# Patient Record
Sex: MALE | Race: WHITE | NOT HISPANIC OR LATINO | Employment: OTHER | URBAN - NONMETROPOLITAN AREA
[De-identification: names, ages, dates, MRNs, and addresses within clinical notes are randomized per-mention and may not be internally consistent; named-entity substitution may affect disease eponyms.]

---

## 2024-05-11 ENCOUNTER — OFFICE VISIT (OUTPATIENT)
Dept: URGENT CARE | Facility: CLINIC | Age: 59
End: 2024-05-11
Payer: COMMERCIAL

## 2024-05-11 ENCOUNTER — HOSPITAL ENCOUNTER (OUTPATIENT)
Facility: MEDICAL CENTER | Age: 59
End: 2024-05-11
Attending: NURSE PRACTITIONER
Payer: COMMERCIAL

## 2024-05-11 VITALS
DIASTOLIC BLOOD PRESSURE: 70 MMHG | RESPIRATION RATE: 14 BRPM | HEART RATE: 100 BPM | OXYGEN SATURATION: 96 % | TEMPERATURE: 98 F | HEIGHT: 68 IN | WEIGHT: 178.8 LBS | SYSTOLIC BLOOD PRESSURE: 110 MMHG | BODY MASS INDEX: 27.1 KG/M2

## 2024-05-11 DIAGNOSIS — R30.0 DYSURIA: ICD-10-CM

## 2024-05-11 DIAGNOSIS — N30.00 ACUTE CYSTITIS WITHOUT HEMATURIA: ICD-10-CM

## 2024-05-11 LAB
APPEARANCE UR: CLEAR
BILIRUB UR STRIP-MCNC: NEGATIVE MG/DL
COLOR UR AUTO: NORMAL
GLUCOSE UR STRIP.AUTO-MCNC: NORMAL MG/DL
KETONES UR STRIP.AUTO-MCNC: NEGATIVE MG/DL
LEUKOCYTE ESTERASE UR QL STRIP.AUTO: NEGATIVE
NITRITE UR QL STRIP.AUTO: POSITIVE
PH UR STRIP.AUTO: 7 [PH] (ref 5–8)
PROT UR QL STRIP: NEGATIVE MG/DL
RBC UR QL AUTO: NEGATIVE
SP GR UR STRIP.AUTO: 1.01
UROBILINOGEN UR STRIP-MCNC: 1 MG/DL

## 2024-05-11 PROCEDURE — 3074F SYST BP LT 130 MM HG: CPT | Performed by: NURSE PRACTITIONER

## 2024-05-11 PROCEDURE — 99213 OFFICE O/P EST LOW 20 MIN: CPT | Performed by: NURSE PRACTITIONER

## 2024-05-11 PROCEDURE — 3078F DIAST BP <80 MM HG: CPT | Performed by: NURSE PRACTITIONER

## 2024-05-11 PROCEDURE — 81002 URINALYSIS NONAUTO W/O SCOPE: CPT | Performed by: NURSE PRACTITIONER

## 2024-05-11 RX ORDER — SULFAMETHOXAZOLE AND TRIMETHOPRIM 800; 160 MG/1; MG/1
1 TABLET ORAL 2 TIMES DAILY
Qty: 14 TABLET | Refills: 0 | Status: SHIPPED | OUTPATIENT
Start: 2024-05-11 | End: 2024-05-18

## 2024-05-11 RX ORDER — TAMSULOSIN HYDROCHLORIDE 0.4 MG/1
CAPSULE ORAL
COMMUNITY
Start: 2024-03-21 | End: 2024-10-04

## 2024-05-11 RX ORDER — LEVOFLOXACIN 750 MG/1
TABLET, FILM COATED ORAL
COMMUNITY
Start: 2024-05-03 | End: 2024-05-18

## 2024-05-11 NOTE — PROGRESS NOTES
Date: 05/11/24        Chief Complaint   Patient presents with    UTI     Urgency, burning, frequency x yesterday         History of Present Illness: 59 y.o.  male presents to clinic with burning with urination urinary frequency and urgency since yesterday.  Patient denies any fevers body aches pelvic pain or flank pain.  He denies any testicular pain.  He denies any possibility STI STD.  No penile drainage or lesions.      ROS:    No severe shortness of breath   No Cardiac like chest pain, as discussed   As otherwise stated in HPI    Medical/SX/ Social History:  Reviewed per chart    Pertinent Medications:    Current Outpatient Medications on File Prior to Visit   Medication Sig Dispense Refill    tamsulosin (FLOMAX) 0.4 MG capsule TAKE TWO CAPSULES BY MOUTH WITH THE EVENING MEAL FOR PROSTATE      levoFLOXacin (LEVAQUIN) 750 MG tablet TAKE 1 TABLET BY MOUTH ONCE A DAY FOR 10 DAYS       No current facility-administered medications on file prior to visit.        Allergies:    Penicillins     Problem list, medications, and allergies reviewed by myself today in Epic     Physical Exam:    Vitals:    05/11/24 1653   BP: 110/70   Pulse: 100   Resp: 14   Temp: 36.7 °C (98 °F)   SpO2: 96%             Physical Exam  Constitutional:       General: He is not in acute distress.     Appearance: Normal appearance. He is not ill-appearing, toxic-appearing or diaphoretic.   HENT:      Head: Normocephalic and atraumatic.   Cardiovascular:      Rate and Rhythm: Normal rate and regular rhythm.      Heart sounds: Normal heart sounds.   Pulmonary:      Effort: Pulmonary effort is normal.   Abdominal:      General: Abdomen is flat. Bowel sounds are normal.      Palpations: Abdomen is soft.      Tenderness: There is no abdominal tenderness. There is no right CVA tenderness, left CVA tenderness or guarding.   Skin:     General: Skin is warm.      Capillary Refill: Capillary refill takes less than 2 seconds.      Coloration: Skin is not  cyanotic or pale.   Neurological:      Mental Status: He is alert and oriented to person, place, and time.      Gait: Gait is intact.   Psychiatric:         Behavior: Behavior normal. Behavior is cooperative.              Diagnostics:      Results for orders placed or performed in visit on 05/11/24   POCT Urinalysis   Result Value Ref Range    POC Color ORANGE Negative    POC Appearance CLEAR Negative    POC Glucose 100 MG/DL Negative mg/dL    POC Bilirubin NEGATIVE Negative mg/dL    POC Ketones NEGATIVE Negative mg/dL    POC Specific Gravity 1.015 <1.005 - >1.030    POC Blood NEGATIVE Negative    POC Urine PH 7.0 5.0 - 8.0    POC Protein NEGATIVE Negative mg/dL    POC Urobiligen 1.0 Negative (0.2) mg/dL    POC Nitrites POSITIVE Negative    POC Leukocyte Esterase NEGATIVE Negative       Urine Culture Pending     Medical Decision making and plan :  I personally reviewed prior external notes and test results pertinent to today's visit. Pt is clinically stable at today's acute urgent care visit.  Patient appears nontoxic with no acute distress noted. Appropriate for outpatient care at this time.      Pleasant 59 y.o. male presented clinic with HPI exam findings consistent with acute cystitis.  Fortunately no CVA tenderness on exam.  Patient is afebrile and nontoxic.  Will treat based on symptoms.  Urine dip positive discussed to increase fluids, void often, post coital voiding and hygiene.   Urine culture currently pending.   Discussed if indicated will discuss results or MyChart.     1. Acute cystitis without hematuria    - sulfamethoxazole-trimethoprim (BACTRIM DS) 800-160 MG tablet; Take 1 Tablet by mouth 2 times a day for 7 days.  Dispense: 14 Tablet; Refill: 0    2. Dysuria    - POCT Urinalysis  - URINE CULTURE(NEW); Future       Shared decision-making was utilized with patient for treatment plan. Medication discussed included indication for use and the potential benefits and side effects. Education was provided  regarding the aforementioned assessments.  Differential Diagnosis, natural history, and supportive care discussed. All of the patient's questions were answered to their satisfaction at the time of discharge. Patient was encouraged to monitor symptoms closely. Those signs and symptoms which would warrant concern and mandate seeking a higher level of service through the emergency department discussed at length.  Patient stated agreement and understanding of this plan of care.    Disposition:  Home in stable condition       Voice Recognition Disclaimer:  Portions of this document were created using voice recognition software. The software does have a chance of producing errors of grammar and possibly content. I have made every reasonable attempt to correct obvious errors, but there may be errors of grammar and possibly content that I did not discover before finalizing the documentation.    ELIO Pineda.

## 2024-05-12 DIAGNOSIS — R30.0 DYSURIA: ICD-10-CM

## 2024-05-14 LAB
BACTERIA UR CULT: NORMAL
SIGNIFICANT IND 70042: NORMAL
SITE SITE: NORMAL
SOURCE SOURCE: NORMAL

## 2024-05-18 ENCOUNTER — OFFICE VISIT (OUTPATIENT)
Dept: URGENT CARE | Facility: CLINIC | Age: 59
End: 2024-05-18
Payer: COMMERCIAL

## 2024-05-18 ENCOUNTER — HOSPITAL ENCOUNTER (OUTPATIENT)
Facility: MEDICAL CENTER | Age: 59
End: 2024-05-18
Attending: NURSE PRACTITIONER
Payer: COMMERCIAL

## 2024-05-18 VITALS
HEART RATE: 96 BPM | OXYGEN SATURATION: 94 % | SYSTOLIC BLOOD PRESSURE: 114 MMHG | DIASTOLIC BLOOD PRESSURE: 76 MMHG | HEIGHT: 68 IN | WEIGHT: 179 LBS | BODY MASS INDEX: 27.13 KG/M2 | RESPIRATION RATE: 20 BRPM | TEMPERATURE: 97.6 F

## 2024-05-18 DIAGNOSIS — N30.01 ACUTE CYSTITIS WITH HEMATURIA: Primary | ICD-10-CM

## 2024-05-18 DIAGNOSIS — Z87.438 HISTORY OF ACUTE PROSTATITIS: ICD-10-CM

## 2024-05-18 DIAGNOSIS — Z86.19 HISTORY OF SEPSIS: ICD-10-CM

## 2024-05-18 DIAGNOSIS — R30.0 DYSURIA: ICD-10-CM

## 2024-05-18 LAB
APPEARANCE UR: CLEAR
BILIRUB UR STRIP-MCNC: NEGATIVE MG/DL
COLOR UR AUTO: NORMAL
GLUCOSE UR STRIP.AUTO-MCNC: NEGATIVE MG/DL
KETONES UR STRIP.AUTO-MCNC: NORMAL MG/DL
LEUKOCYTE ESTERASE UR QL STRIP.AUTO: NEGATIVE
NITRITE UR QL STRIP.AUTO: POSITIVE
PH UR STRIP.AUTO: 7 [PH] (ref 5–8)
PROT UR QL STRIP: NORMAL MG/DL
RBC UR QL AUTO: NEGATIVE
SP GR UR STRIP.AUTO: 1.02
UROBILINOGEN UR STRIP-MCNC: 0.2 MG/DL

## 2024-05-18 PROCEDURE — 99214 OFFICE O/P EST MOD 30 MIN: CPT | Performed by: NURSE PRACTITIONER

## 2024-05-18 PROCEDURE — 3078F DIAST BP <80 MM HG: CPT | Performed by: NURSE PRACTITIONER

## 2024-05-18 PROCEDURE — 81002 URINALYSIS NONAUTO W/O SCOPE: CPT | Performed by: NURSE PRACTITIONER

## 2024-05-18 PROCEDURE — 3074F SYST BP LT 130 MM HG: CPT | Performed by: NURSE PRACTITIONER

## 2024-05-18 RX ORDER — CIPROFLOXACIN 500 MG/1
500 TABLET, FILM COATED ORAL EVERY 12 HOURS
Qty: 28 TABLET | Refills: 0 | Status: SHIPPED | OUTPATIENT
Start: 2024-05-18 | End: 2024-06-01

## 2024-05-18 RX ORDER — PHENAZOPYRIDINE HYDROCHLORIDE 200 MG/1
200 TABLET, FILM COATED ORAL 3 TIMES DAILY
Qty: 6 TABLET | Refills: 0 | Status: SHIPPED | OUTPATIENT
Start: 2024-05-18 | End: 2024-05-20

## 2024-05-18 NOTE — PROGRESS NOTES
"Carlos Ibanez is a 59 y.o. male who presents for UTI (Burning, going often)      HPI  This is a new problem. Carlos Ibanez is a 59 y.o. patient who presents to urgent care with c/o: Complaints of urinary discomfort, burning and urinary frequency.  Feels like he has a bladder infection.  He was seen a week ago was started on Bactrim.  He did not finish the Bactrim because he was contacted by the provider and said that he could stop taking it.  He does have a history of prostatitis as well as urosepsis.  As this feels very similar to when he had prostatitis before.  He denies fever, chills, nausea, vomiting, significant nocturia.  He has been drinking a lot of extra fluids to try to flush his bladder.  No other aggravating leaving factors.    ROS See HPI    Allergies:       Allergies   Allergen Reactions    Penicillins Rash     Other Reaction(s): Unknown       PMSFS Hx:  No past medical history on file.  No past surgical history on file.  No family history on file.  Social History     Tobacco Use    Smoking status: Not on file    Smokeless tobacco: Not on file   Substance Use Topics    Alcohol use: Not on file       Problems:   There is no problem list on file for this patient.      Medications:   Current Outpatient Medications on File Prior to Visit   Medication Sig Dispense Refill    tamsulosin (FLOMAX) 0.4 MG capsule TAKE TWO CAPSULES BY MOUTH WITH THE EVENING MEAL FOR PROSTATE (Patient not taking: Reported on 5/18/2024)      sulfamethoxazole-trimethoprim (BACTRIM DS) 800-160 MG tablet Take 1 Tablet by mouth 2 times a day for 7 days. 14 Tablet 0     No current facility-administered medications on file prior to visit.        Objective:     /76   Pulse 96   Temp 36.4 °C (97.6 °F) (Temporal)   Resp 20   Ht 1.727 m (5' 8\")   Wt 81.2 kg (179 lb)   SpO2 94%   BMI 27.22 kg/m²     Physical Exam  Vitals and nursing note reviewed.   Constitutional:       Appearance: Normal appearance. He is well-developed. He is " not ill-appearing.   Cardiovascular:      Rate and Rhythm: Normal rate.   Pulmonary:      Effort: Pulmonary effort is normal.   Abdominal:      Palpations: Abdomen is not rigid.   Skin:     General: Skin is warm and dry.   Neurological:      Mental Status: He is alert and oriented to person, place, and time.   Psychiatric:         Mood and Affect: Mood normal.         Behavior: Behavior normal.       Results for orders placed or performed in visit on 05/18/24   POCT Urinalysis   Result Value Ref Range    POC Color orange Negative    POC Appearance clear Negative    POC Glucose negative Negative mg/dL    POC Bilirubin negative Negative mg/dL    POC Ketones ngative Negative mg/dL    POC Specific Gravity 1.020 <1.005 - >1.030    POC Blood negative Negative    POC Urine PH 7.0 5.0 - 8.0    POC Protein trace Negative mg/dL    POC Urobiligen 0.2 Negative (0.2) mg/dL    POC Nitrites positive Negative    POC Leukocyte Esterase negative Negative         Assessment /Associated Orders:      1. Acute cystitis with hematuria  Urine Culture    ciprofloxacin (CIPRO) 500 MG Tab    Referral to Urology      2. Dysuria  POCT Urinalysis    phenazopyridine (PYRIDIUM) 200 MG Tab      3. History of acute prostatitis  ciprofloxacin (CIPRO) 500 MG Tab    Referral to Urology      4. History of sepsis  Referral to Urology              Medical Decision Making:    Carlos is a very pleasant 59 y.o. male who is clinically stable at today's acute urgent care visit.  No acute distress noted.  VSS. Appropriate for outpatient care at this time.   Acute problem today with uncertain prognosis.   He was seen previously in urgent care on May 11, 2024 where he had a positive nitrate urinalysis.  His urine culture was negative for bacterial infection.  His symptoms have persisted and he does test positive for for nitrates today.  Positive nitrates in his urine today with trace protein.  His previous urine culture was negative for bacterial infection.  He  has no symptoms of sepsis today.  Will treat with the extended ciprofloxacin for 2 weeks due to his history.  He has been advised that if his culture comes back negative for bacteria he should continue taking the ciprofloxacin.  He verbalized understanding.  He has just moved to the area.  I did put in a referral to urology for him.  He has been seeing by urology in the past where he used to live.  Stay well-hydrated  Discussed Dx, management options (risks,benefits, and alternatives to planned treatment), natural progression and supportive care.  Expressed understanding and the treatment plan was agreed upon.   Questions were encouraged and answered   Return to urgent care prn if new or worsening sx or if there is no improvement in condition prn.    Educated in Red flags and indications to immediately call 911 or present to the Emergency Department.       Time I spent evaluating Carlos Ibanez in urgent care today was 30  minutes. This time includes preparing for visit, reviewing any pertinent notes or test results, counseling/education, exam, obtaining HPI, interpretation of lab tests, medication management and documentation as indicated above.Time does not include separately billable procedures noted .       Please note that this dictation was created using voice recognition software. I have worked with consultants from the vendor as well as technical experts from [employer] [facility] to optimize the interface. I have made every reasonable attempt to correct obvious errors, but I expect that there are errors of grammar and possibly content that I did not discover before finalizing the note.  This note was electronically signed by provider

## 2024-05-19 DIAGNOSIS — N30.01 ACUTE CYSTITIS WITH HEMATURIA: ICD-10-CM

## 2024-05-21 LAB
BACTERIA UR CULT: NORMAL
SIGNIFICANT IND 70042: NORMAL
SITE SITE: NORMAL
SOURCE SOURCE: NORMAL

## 2024-06-09 ENCOUNTER — OFFICE VISIT (OUTPATIENT)
Dept: URGENT CARE | Facility: CLINIC | Age: 59
End: 2024-06-09
Payer: COMMERCIAL

## 2024-06-09 VITALS
OXYGEN SATURATION: 95 % | SYSTOLIC BLOOD PRESSURE: 146 MMHG | BODY MASS INDEX: 27.43 KG/M2 | RESPIRATION RATE: 16 BRPM | DIASTOLIC BLOOD PRESSURE: 90 MMHG | HEART RATE: 101 BPM | TEMPERATURE: 99 F | HEIGHT: 68 IN | WEIGHT: 181 LBS

## 2024-06-09 DIAGNOSIS — M54.6 ACUTE RIGHT-SIDED THORACIC BACK PAIN: ICD-10-CM

## 2024-06-09 DIAGNOSIS — R10.30 LOWER ABDOMINAL PAIN: ICD-10-CM

## 2024-06-09 DIAGNOSIS — R45.89 ANXIETY ABOUT HEALTH: ICD-10-CM

## 2024-06-09 LAB
APPEARANCE UR: CLEAR
BILIRUB UR STRIP-MCNC: NEGATIVE MG/DL
COLOR UR AUTO: YELLOW
GLUCOSE UR STRIP.AUTO-MCNC: NEGATIVE MG/DL
KETONES UR STRIP.AUTO-MCNC: NEGATIVE MG/DL
LEUKOCYTE ESTERASE UR QL STRIP.AUTO: NEGATIVE
NITRITE UR QL STRIP.AUTO: NORMAL
PH UR STRIP.AUTO: 7 [PH] (ref 5–8)
PROT UR QL STRIP: NEGATIVE MG/DL
RBC UR QL AUTO: NEGATIVE
SP GR UR STRIP.AUTO: 1.01
UROBILINOGEN UR STRIP-MCNC: 0.2 MG/DL

## 2024-06-09 PROCEDURE — 3080F DIAST BP >= 90 MM HG: CPT | Performed by: NURSE PRACTITIONER

## 2024-06-09 PROCEDURE — 81002 URINALYSIS NONAUTO W/O SCOPE: CPT | Performed by: NURSE PRACTITIONER

## 2024-06-09 PROCEDURE — 3077F SYST BP >= 140 MM HG: CPT | Performed by: NURSE PRACTITIONER

## 2024-06-09 PROCEDURE — 99213 OFFICE O/P EST LOW 20 MIN: CPT | Performed by: NURSE PRACTITIONER

## 2024-06-09 NOTE — PROGRESS NOTES
"Subjective:   Carlos Ibanez is a 59 y.o. male who presents for UTI (Right side flank pain x 1 day)       HPI  Patient is a pleasant 59 y.o. who presents for evaluation of concern of right thoracic pain, with concern of UTI versus prostatitis.  Patient also thinks he may have had some burning with urination, however is concerned that possibly he is overthinking the situation.  He has been pushing fluids for his symptoms.  He reports pain could also related to musculoskeletal in nature as he has been quite active.    ROS  All other systems are negative except as documented above within HPI.    MEDS:   Current Outpatient Medications:     tamsulosin (FLOMAX) 0.4 MG capsule, TAKE TWO CAPSULES BY MOUTH WITH THE EVENING MEAL FOR PROSTATE (Patient not taking: Reported on 5/18/2024), Disp: , Rfl:   ALLERGIES:   Allergies   Allergen Reactions    Penicillins Rash     Other Reaction(s): Unknown       Patient's PMH, SocHx, SurgHx, FamHx, Drug allergies and medications were reviewed.     Objective:   BP (!) 146/90   Pulse (!) 101   Temp 37.2 °C (99 °F) (Temporal)   Resp 16   Ht 1.727 m (5' 8\")   Wt 82.1 kg (181 lb)   SpO2 95%   BMI 27.52 kg/m²     Physical Exam  Vitals and nursing note reviewed.   Constitutional:       General: He is awake.      Appearance: Normal appearance. He is well-developed.   HENT:      Head: Normocephalic and atraumatic.      Right Ear: External ear normal.      Left Ear: External ear normal.      Nose: Nose normal.      Mouth/Throat:      Lips: Pink.      Mouth: Mucous membranes are moist.      Pharynx: Oropharynx is clear.   Eyes:      General: Lids are normal.      Extraocular Movements: Extraocular movements intact.      Conjunctiva/sclera: Conjunctivae normal.   Cardiovascular:      Rate and Rhythm: Normal rate and regular rhythm.      Pulses: Normal pulses.      Heart sounds: Normal heart sounds.   Pulmonary:      Effort: Pulmonary effort is normal.      Breath sounds: Normal breath sounds. "   Abdominal:      General: Bowel sounds are normal.   Musculoskeletal:         General: Normal range of motion.        Arms:       Cervical back: Normal range of motion.      Comments: Pain in area as diagrammed.   Skin:     General: Skin is warm and dry.   Neurological:      Mental Status: He is alert and oriented to person, place, and time.   Psychiatric:         Mood and Affect: Mood normal.         Behavior: Behavior normal. Behavior is cooperative.         Thought Content: Thought content normal.         Judgment: Judgment normal.         Assessment/Plan:   Assessment    1. Acute right-sided thoracic back pain    2. Lower abdominal pain  - POCT Urinalysis    3. Anxiety about health      Vital signs stable at today's acute urgent care visit.  Reviewed POCT testing with patient, which is completely within normal limits.  Patient is happy and relieved to hear this, thinks his symptoms may be related to musculoskeletal in nature.  He has upcoming appointment with urology in 2 weeks.      Advised the patient to follow-up with the primary care provider if symptoms persist.  Red flags discussed and indications to immediately call 911 or present to the ED. All questions were encouraged and answered to the patient's satisfaction and understanding, and they agree to the plan of care.     This is an acute problem with uncertain prognosis, medication management and instructions as well as management options were provided.  I personally reviewed prior external notes and test results pertinent to today and independently reviewed and interpreted all diagnostics, to include POCT testing. Time spent evaluating this patient includes preparing for visit, counseling/education, exam, evaluation, obtaining history, and ordering lab/test/procedures.      Please note that this dictation was created using voice recognition software. I have made a reasonable attempt to correct obvious errors, but I expect that there are errors of  grammar and possibly content that I did not discover before finalizing the note.

## 2024-06-20 ENCOUNTER — HOSPITAL ENCOUNTER (OUTPATIENT)
Facility: MEDICAL CENTER | Age: 59
End: 2024-06-20
Attending: UROLOGY
Payer: COMMERCIAL

## 2024-06-20 ENCOUNTER — OFFICE VISIT (OUTPATIENT)
Dept: UROLOGY | Facility: MEDICAL CENTER | Age: 59
End: 2024-06-20
Payer: COMMERCIAL

## 2024-06-20 VITALS
DIASTOLIC BLOOD PRESSURE: 90 MMHG | OXYGEN SATURATION: 99 % | HEIGHT: 68 IN | HEART RATE: 99 BPM | WEIGHT: 181 LBS | TEMPERATURE: 99.2 F | BODY MASS INDEX: 27.43 KG/M2 | SYSTOLIC BLOOD PRESSURE: 132 MMHG

## 2024-06-20 DIAGNOSIS — N41.0 ACUTE PROSTATITIS: ICD-10-CM

## 2024-06-20 DIAGNOSIS — N40.1 BPH WITH OBSTRUCTION/LOWER URINARY TRACT SYMPTOMS: ICD-10-CM

## 2024-06-20 DIAGNOSIS — N13.8 BPH WITH OBSTRUCTION/LOWER URINARY TRACT SYMPTOMS: ICD-10-CM

## 2024-06-20 DIAGNOSIS — R31.9 HEMATURIA, UNSPECIFIED TYPE: ICD-10-CM

## 2024-06-20 LAB
APPEARANCE UR: CLEAR
APPEARANCE UR: CLEAR
BILIRUB UR QL STRIP.AUTO: NEGATIVE
BILIRUB UR STRIP-MCNC: NORMAL MG/DL
COLOR UR AUTO: YELLOW
COLOR UR: YELLOW
GLUCOSE UR STRIP.AUTO-MCNC: NEGATIVE MG/DL
GLUCOSE UR STRIP.AUTO-MCNC: NORMAL MG/DL
KETONES UR STRIP.AUTO-MCNC: NEGATIVE MG/DL
KETONES UR STRIP.AUTO-MCNC: NORMAL MG/DL
LEUKOCYTE ESTERASE UR QL STRIP.AUTO: NEGATIVE
LEUKOCYTE ESTERASE UR QL STRIP.AUTO: NORMAL
MICRO URNS: NORMAL
NITRITE UR QL STRIP.AUTO: NEGATIVE
NITRITE UR QL STRIP.AUTO: NORMAL
PH UR STRIP.AUTO: 7 [PH] (ref 5–8)
PH UR STRIP.AUTO: 7.5 [PH] (ref 5–8)
PROT UR QL STRIP: NEGATIVE MG/DL
PROT UR QL STRIP: NORMAL MG/DL
RBC UR QL AUTO: NEGATIVE
RBC UR QL AUTO: NORMAL
SP GR UR STRIP.AUTO: 1.01
SP GR UR STRIP.AUTO: 1.01
UROBILINOGEN UR STRIP-MCNC: 0.2 MG/DL
UROBILINOGEN UR STRIP.AUTO-MCNC: 0.2 MG/DL

## 2024-06-20 PROCEDURE — 87086 URINE CULTURE/COLONY COUNT: CPT

## 2024-06-20 PROCEDURE — 81002 URINALYSIS NONAUTO W/O SCOPE: CPT | Performed by: UROLOGY

## 2024-06-20 PROCEDURE — 81003 URINALYSIS AUTO W/O SCOPE: CPT

## 2024-06-20 PROCEDURE — 3080F DIAST BP >= 90 MM HG: CPT | Performed by: UROLOGY

## 2024-06-20 PROCEDURE — 99203 OFFICE O/P NEW LOW 30 MIN: CPT | Performed by: UROLOGY

## 2024-06-20 PROCEDURE — 3075F SYST BP GE 130 - 139MM HG: CPT | Performed by: UROLOGY

## 2024-06-20 NOTE — PROGRESS NOTES
Chief Complaint: prostatitis    HPI: Carlos Ibanez is a 59 y.o. male with a history of urosepsis due to acute prostatitis requiring two hospitalizations in 2013; this occurred again in 2022 after which he was started on tamsulosin (which he continues to take now, 0.8 mg daily).     In May he began to feel dysuria and perineal pain again, and was evaluated in urgent care. After a short course of Bactrim he was asked to stop the antibiotic because urine cultures returned negative. He returned to urgent care with continued symptoms and then took two weeks of cipro. Today he feels improved but still has terminal dysuria. No gross hematuria or cloudy urine.     He previously considered a TURP both for obstructive urinary symptoms and to help prevent more cases of prostatitis.     Past Medical History:  History reviewed. No pertinent past medical history.    Past Surgical History:  History reviewed. No pertinent surgical history.    Family History:  History reviewed. No pertinent family history.    Social History:  Social History     Socioeconomic History    Marital status:      Spouse name: Not on file    Number of children: Not on file    Years of education: Not on file    Highest education level: Not on file   Occupational History    Not on file   Tobacco Use    Smoking status: Never    Smokeless tobacco: Never   Substance and Sexual Activity    Alcohol use: Not on file    Drug use: Not on file    Sexual activity: Not on file   Other Topics Concern    Not on file   Social History Narrative    Not on file     Social Determinants of Health     Financial Resource Strain: Not on file   Food Insecurity: Not on file   Transportation Needs: Not on file   Physical Activity: Not on file   Stress: Not on file   Social Connections: Not on file   Intimate Partner Violence: Not on file   Housing Stability: Not on file       Medications:  Current Outpatient Medications   Medication Sig Dispense Refill    tamsulosin (FLOMAX)  0.4 MG capsule        No current facility-administered medications for this visit.       Allergies:  Allergies   Allergen Reactions    Penicillins Rash     Other Reaction(s): Unknown       Review of Systems:  Constitutional: Negative for fever, chills and malaise/fatigue.   HENT: Negative for congestion.    Eyes: Negative for pain.   Respiratory: Negative for cough and shortness of breath.    Cardiovascular: Negative for leg swelling.   Gastrointestinal: Negative for nausea, vomiting, abdominal pain and diarrhea.   Genitourinary: Positive for dysuria  Skin: Negative for rash.   Neurological: Negative for dizziness, focal weakness and headaches.   Endo/Heme/Allergies: Does not bruise/bleed easily.   Psychiatric/Behavioral: Negative for depression.  The patient is not nervous/anxious.        Physical Exam:  Vitals:    06/20/24 1322   BP: (!) 132/90   Pulse: 99   Temp: 37.3 °C (99.2 °F)   SpO2: 99%       GENERAL: well appearing, well nourished, NAD  RESP: respiratory effort normal  ABDOMEN: soft, nontender, nondistended, no masses or organomegaly  HERNIAS: no hernias found on exam  SKIN/LYMPH: normal coloration and turgor, no suspicious skin lesions noted  NEURO/PSYCH: alert, oriented, normal speech, no focal findings or movement disorder noted  EXTREMITIES: peripheral pulses normal, no pedal edema, no clubbing or cyanosis  GENITOURINARY: normal male external genitalia, penis is normal, testes descended bilaterally, and mild right testis tenderness  RECTAL: Normal rectal tone,, no anorectal masses, prostate about 35g and mildly tender with indurated texture      Data Review:    Labs:  POCT UA   Lab Results   Component Value Date/Time    POCCOLOR yellow 06/20/2024 01:21 AM    POCAPPEAR clear 06/20/2024 01:21 AM    POCLEUKEST neg 06/20/2024 01:21 AM    POCNITRITE neg 06/20/2024 01:21 AM    POCUROBILIGE 0.2 06/20/2024 01:21 AM    POCPROTEIN neg 06/20/2024 01:21 AM    POCURPH 7.0 06/20/2024 01:21 AM    POCBLOOD neg  "06/20/2024 01:21 AM    POCSPGRV 1.015 06/20/2024 01:21 AM    POCKETONES neg 06/20/2024 01:21 AM    POCBILIRUBIN neg 06/20/2024 01:21 AM    POCGLUCUA neg 06/20/2024 01:21 AM      CBC No results found for: \"WBC\", \"RBC\", \"HEMOGLOBIN\", \"HEMATOCRIT\", \"MCV\", \"MCH\", \"MCHC\", \"RDW\", \"MPV\", \"LYMPHOCYTES\", \"LYMPHS\", \"MONOCYTES\", \"MONOS\", \"EOSINOPHILS\", \"EOS\", \"BASOPHILS\", \"BASO\", \"NRBC\"    CMP No results found for: \"SODIUM\", \"POTASSIUM\", \"CHLORIDE\", \"CO2\", \"ANION\", \"GLUCOSE\", \"BUN\", \"CREATININE\", \"GFRCKD\", \"CALCIUM\", \"CORRCALC\", \"ASTSGOT\", \"ALTSGPT\", \"ALKPHOSPHAT\", \"TBILIRUBIN\", \"ALBUMIN\", \"TOTPROTEIN\", \"GLOBULIN\", \"AGRATIO\", \"GLYCOHEM\"      Assessment: 59 y.o. male with a history of multiple episodes of acute prostatitis, as well as obstructive urinary symptoms, here today to establish care as he's had another episode of acute prostatitis. He has taken 2 weeks of ciprofloxacin and has improved, but still feels terminal dysuria.     We discussed short and long-term treatment strategies including medical and surgical treatment.      Plan:    -Post-prostatic massage UA and culture  -Will call with results and any further treatment  -US bladder; assess prostate volume  -RTC in 6 weeks      Bala Shrestha MD  "

## 2024-06-23 LAB
BACTERIA UR CULT: NORMAL
SIGNIFICANT IND 70042: NORMAL
SITE SITE: NORMAL
SOURCE SOURCE: NORMAL

## 2024-08-18 ENCOUNTER — HOSPITAL ENCOUNTER (OUTPATIENT)
Facility: MEDICAL CENTER | Age: 59
End: 2024-08-18
Payer: COMMERCIAL

## 2024-08-18 ENCOUNTER — OFFICE VISIT (OUTPATIENT)
Dept: URGENT CARE | Facility: CLINIC | Age: 59
End: 2024-08-18
Payer: COMMERCIAL

## 2024-08-18 VITALS
RESPIRATION RATE: 16 BRPM | OXYGEN SATURATION: 98 % | SYSTOLIC BLOOD PRESSURE: 116 MMHG | TEMPERATURE: 97.8 F | HEART RATE: 80 BPM | DIASTOLIC BLOOD PRESSURE: 70 MMHG | HEIGHT: 68 IN | BODY MASS INDEX: 26.98 KG/M2 | WEIGHT: 178 LBS

## 2024-08-18 DIAGNOSIS — R30.0 DYSURIA: ICD-10-CM

## 2024-08-18 DIAGNOSIS — Z87.438 HISTORY OF ACUTE PROSTATITIS: ICD-10-CM

## 2024-08-18 LAB
APPEARANCE UR: CLEAR
BILIRUB UR STRIP-MCNC: NEGATIVE MG/DL
COLOR UR AUTO: YELLOW
GLUCOSE UR STRIP.AUTO-MCNC: NEGATIVE MG/DL
KETONES UR STRIP.AUTO-MCNC: NEGATIVE MG/DL
LEUKOCYTE ESTERASE UR QL STRIP.AUTO: NEGATIVE
NITRITE UR QL STRIP.AUTO: NEGATIVE
PH UR STRIP.AUTO: 7 [PH] (ref 5–8)
PROT UR QL STRIP: NEGATIVE MG/DL
RBC UR QL AUTO: NEGATIVE
SP GR UR STRIP.AUTO: 1.01
UROBILINOGEN UR STRIP-MCNC: 0.2 MG/DL

## 2024-08-18 PROCEDURE — 87086 URINE CULTURE/COLONY COUNT: CPT

## 2024-08-18 PROCEDURE — 3078F DIAST BP <80 MM HG: CPT

## 2024-08-18 PROCEDURE — 99213 OFFICE O/P EST LOW 20 MIN: CPT

## 2024-08-18 PROCEDURE — 87591 N.GONORRHOEAE DNA AMP PROB: CPT

## 2024-08-18 PROCEDURE — 81002 URINALYSIS NONAUTO W/O SCOPE: CPT

## 2024-08-18 PROCEDURE — 3074F SYST BP LT 130 MM HG: CPT

## 2024-08-18 PROCEDURE — 87491 CHLMYD TRACH DNA AMP PROBE: CPT

## 2024-08-18 RX ORDER — TRIAZOLAM 0.25 MG
TABLET ORAL
COMMUNITY
Start: 2024-07-08

## 2024-08-18 RX ORDER — METHYLPREDNISOLONE 4 MG
TABLET, DOSE PACK ORAL
COMMUNITY
Start: 2024-06-06

## 2024-08-18 ASSESSMENT — ENCOUNTER SYMPTOMS: FEVER: 0

## 2024-08-18 NOTE — PROGRESS NOTES
Subjective:     CHIEF COMPLAINT  Chief Complaint   Patient presents with    UTI     Patient coming in for possible urinary infection, burning feeling        HPI  Carlos Ibanez is a very pleasant 59 y.o. male who presents with painful urination and the sensation of bladder fullness that started Friday morning.  He reports prior to the onset of his symptoms he had been given oral sex.  He started to develop painful urination the next morning.  He has not noticed any sores or lesions on his penis.  He does have a history of chronic prostatitis and is concerned that he may be experiencing a flare.  He states that the sensation of bladder fullness has completely resolved at this time but he is still having some burning with urination.  He has called his urologist and plans to follow-up with him.  He has not had any fevers and is otherwise feeling well.    REVIEW OF SYSTEMS  Review of Systems   Constitutional:  Negative for fever.   Genitourinary:  Positive for dysuria.   Skin:  Negative for rash.       PAST MEDICAL HISTORY  There are no problems to display for this patient.      SURGICAL HISTORY  patient denies any surgical history    ALLERGIES  Allergies   Allergen Reactions    Penicillins Rash     Other Reaction(s): Unknown       CURRENT MEDICATIONS  Home Medications       Reviewed by Hallie Valentin P.A.-C. (Physician Assistant) on 08/18/24 at 1028  Med List Status: <None>     Medication Last Dose Status   methylPREDNISolone (MEDROL DOSEPAK) 4 MG Tablet Therapy Pack Taking Active   tamsulosin (FLOMAX) 0.4 MG capsule Taking Active   triazolam (HALCION) 0.25 MG Tab Taking Active                    SOCIAL HISTORY  Social History     Tobacco Use    Smoking status: Never    Smokeless tobacco: Never   Substance and Sexual Activity    Alcohol use: Not on file    Drug use: Not on file    Sexual activity: Not on file       FAMILY HISTORY  History reviewed. No pertinent family history.       Objective:     VITAL SIGNS: BP  "116/70   Pulse 80   Temp 36.6 °C (97.8 °F) (Temporal)   Resp 16   Ht 1.727 m (5' 8\")   Wt 80.7 kg (178 lb)   SpO2 98%   BMI 27.06 kg/m²     PHYSICAL EXAM  Physical Exam  Vitals reviewed.   Constitutional:       General: He is not in acute distress.     Appearance: Normal appearance. He is not ill-appearing or toxic-appearing.   HENT:      Head: Normocephalic and atraumatic.      Mouth/Throat:      Mouth: Mucous membranes are moist.   Eyes:      Conjunctiva/sclera: Conjunctivae normal.      Pupils: Pupils are equal, round, and reactive to light.   Cardiovascular:      Rate and Rhythm: Normal rate.   Pulmonary:      Effort: Pulmonary effort is normal. No respiratory distress.   Skin:     General: Skin is warm and dry.      Coloration: Skin is not pale.   Neurological:      General: No focal deficit present.      Mental Status: He is alert and oriented to person, place, and time.   Psychiatric:         Mood and Affect: Mood normal.         Assessment/Plan:     1. Dysuria  - POCT Urinalysis  - Chlamydia/GC, PCR (Urine); Future    2. History of acute prostatitis    Other orders  - triazolam (HALCION) 0.25 MG Tab; BRING ALL 3 TABLETS TO DENTAL APPOINTMENT AND TAKE WHEN INSTRUCTED (NOT COVERED)  - methylPREDNISolone (MEDROL DOSEPAK) 4 MG Tablet Therapy Pack; TAKE 6 TABLETS ON DAY 1 AS DIRECTED ON PACKAGE AND DECREASE BY 1 TAB EACH DAY FOR A TOTAL OF 6 DAYS  -Increase hydration   -Follow-up with urology  -Return to clinic if symptoms worsen or fail to resolve  -Tylenol OTC as needed for discomfort    MDM/Comments:  Patient has stable vital signs and is non-toxic appearing.  Urinalysis obtained in office with normal findings and no evidence of acute cystitis/prostatitis.  Urine sent for culture with results pending.  Gonorrhea/chlamydia testing performed in office.  Patient will follow-up with his urologist and will return to the clinic if his symptoms worsen prior to seeing urology.  Discussed supportive care with " hydration, rest, Tylenol as needed. Patient demonstrated understanding of treatment plan at this time and will RTC if symptoms worsen or fail to resolve.     Differential diagnosis, natural history, supportive care, and indications for immediate follow-up discussed. All questions answered. Patient agrees with the plan of care.    Follow-up as needed if symptoms worsen or fail to improve to PCP, Urgent care or Emergency Room.    I have personally reviewed prior external notes and test results pertinent to today's visit.  I have independently reviewed and interpreted all diagnostics ordered during this urgent care acute visit.   Discussed management options (risks,benefits, and alternatives to treatment). Pt expresses understanding and the treatment plan was agreed upon. Questions were encouraged and answered to pt's satisfaction.    Please note that this dictation was created using voice recognition software. I have made a reasonable attempt to correct obvious errors, but I expect that there are errors of grammar and possibly content that I did not discover before finalizing the note.

## 2024-08-19 LAB
C TRACH DNA SPEC QL NAA+PROBE: NEGATIVE
N GONORRHOEA DNA SPEC QL NAA+PROBE: NEGATIVE
SPECIMEN SOURCE: NORMAL

## 2024-08-20 LAB
BACTERIA UR CULT: NORMAL
SIGNIFICANT IND 70042: NORMAL
SITE SITE: NORMAL
SOURCE SOURCE: NORMAL

## 2024-10-18 ENCOUNTER — OFFICE VISIT (OUTPATIENT)
Dept: URGENT CARE | Facility: CLINIC | Age: 59
End: 2024-10-18
Payer: COMMERCIAL

## 2024-10-18 ENCOUNTER — APPOINTMENT (OUTPATIENT)
Dept: URGENT CARE | Facility: CLINIC | Age: 59
End: 2024-10-18
Payer: COMMERCIAL

## 2024-10-18 VITALS
DIASTOLIC BLOOD PRESSURE: 74 MMHG | WEIGHT: 185 LBS | RESPIRATION RATE: 18 BRPM | SYSTOLIC BLOOD PRESSURE: 110 MMHG | HEIGHT: 68 IN | OXYGEN SATURATION: 97 % | HEART RATE: 86 BPM | TEMPERATURE: 98 F | BODY MASS INDEX: 28.04 KG/M2

## 2024-10-18 DIAGNOSIS — L03.113 CELLULITIS OF RIGHT UPPER EXTREMITY: ICD-10-CM

## 2024-10-18 PROCEDURE — 3078F DIAST BP <80 MM HG: CPT | Performed by: NURSE PRACTITIONER

## 2024-10-18 PROCEDURE — 99213 OFFICE O/P EST LOW 20 MIN: CPT | Performed by: NURSE PRACTITIONER

## 2024-10-18 PROCEDURE — 3074F SYST BP LT 130 MM HG: CPT | Performed by: NURSE PRACTITIONER

## 2024-10-18 RX ORDER — CEPHALEXIN 500 MG/1
500 CAPSULE ORAL 4 TIMES DAILY
Qty: 28 CAPSULE | Refills: 0 | Status: SHIPPED | OUTPATIENT
Start: 2024-10-18 | End: 2024-10-25

## 2024-10-18 RX ORDER — PREDNISONE 10 MG/1
40 TABLET ORAL DAILY
Qty: 20 TABLET | Refills: 0 | Status: SHIPPED | OUTPATIENT
Start: 2024-10-18 | End: 2024-10-23

## 2024-10-18 RX ORDER — MUPIROCIN 20 MG/G
1 OINTMENT TOPICAL 2 TIMES DAILY
Qty: 22 G | Refills: 0 | Status: SHIPPED | OUTPATIENT
Start: 2024-10-18 | End: 2024-11-01

## 2024-10-18 RX ORDER — OMEPRAZOLE 40 MG/1
40 CAPSULE, DELAYED RELEASE ORAL
COMMUNITY
Start: 2024-10-16

## 2024-10-18 ASSESSMENT — ENCOUNTER SYMPTOMS
FEVER: 0
SWOLLEN GLANDS: 0

## 2024-11-09 ENCOUNTER — OFFICE VISIT (OUTPATIENT)
Dept: URGENT CARE | Facility: CLINIC | Age: 59
End: 2024-11-09
Payer: COMMERCIAL

## 2024-11-09 VITALS
TEMPERATURE: 97.9 F | BODY MASS INDEX: 28.04 KG/M2 | DIASTOLIC BLOOD PRESSURE: 84 MMHG | WEIGHT: 185 LBS | HEIGHT: 68 IN | OXYGEN SATURATION: 96 % | HEART RATE: 96 BPM | RESPIRATION RATE: 18 BRPM | SYSTOLIC BLOOD PRESSURE: 126 MMHG

## 2024-11-09 DIAGNOSIS — F43.10 POST-TRAUMATIC STRESS REACTION: ICD-10-CM

## 2024-11-09 DIAGNOSIS — F41.0 ANXIETY DISORDER DUE TO GENERAL MEDICAL CONDITION WITH PANIC ATTACK: ICD-10-CM

## 2024-11-09 DIAGNOSIS — J06.9 UPPER RESPIRATORY TRACT INFECTION, UNSPECIFIED TYPE: ICD-10-CM

## 2024-11-09 DIAGNOSIS — F06.4 ANXIETY DISORDER DUE TO GENERAL MEDICAL CONDITION WITH PANIC ATTACK: ICD-10-CM

## 2024-11-09 PROCEDURE — 3079F DIAST BP 80-89 MM HG: CPT

## 2024-11-09 PROCEDURE — 99213 OFFICE O/P EST LOW 20 MIN: CPT | Mod: 25

## 2024-11-09 PROCEDURE — 3074F SYST BP LT 130 MM HG: CPT

## 2024-11-09 RX ORDER — LORAZEPAM 1 MG/1
1 TABLET ORAL EVERY 8 HOURS PRN
Qty: 6 TABLET | Refills: 0 | Status: SHIPPED | OUTPATIENT
Start: 2024-11-09 | End: 2024-11-12

## 2024-11-09 RX ORDER — HYDROXYZINE HYDROCHLORIDE 25 MG/1
25 TABLET, FILM COATED ORAL
Qty: 15 TABLET | Refills: 0 | Status: SHIPPED | OUTPATIENT
Start: 2024-11-09 | End: 2024-11-13

## 2024-11-09 RX ORDER — BENZONATATE 100 MG/1
100 CAPSULE ORAL 3 TIMES DAILY PRN
Qty: 60 CAPSULE | Refills: 0 | Status: SHIPPED | OUTPATIENT
Start: 2024-11-09 | End: 2024-11-13

## 2024-11-09 ASSESSMENT — ENCOUNTER SYMPTOMS
SPUTUM PRODUCTION: 0
HALLUCINATIONS: 0
LOSS OF CONSCIOUSNESS: 0
WHEEZING: 0
FEVER: 0
ORTHOPNEA: 0
SENSORY CHANGE: 0
TINGLING: 0
INSOMNIA: 1
MALAISE: 0
MUSCLE TENSION: 1
FOCAL WEAKNESS: 0
HEADACHES: 0
SORE THROAT: 0
THOUGHT CONTENT - OBSESSIONS: 0
NERVOUS/ANXIOUS: 1
DIARRHEA: 0
PANIC: 1
SEIZURES: 0
DECREASED CONCENTRATION: 0
SHORTNESS OF BREATH: 0
MEMORY LOSS: 0
VOMITING: 0
WEAKNESS: 0
TREMORS: 0
DEPRESSION: 0
RESTLESSNESS: 1
FEELING OF CHOKING: 0
PALPITATIONS: 0
DIZZINESS: 0
NAUSEA: 0
CHILLS: 0
PND: 0
CONFUSION: 0
IRRITABILITY: 0
DEPRESSED MOOD: 0
HYPERVENTILATION: 0
COUGH: 1
COMPULSIONS: 0
SPEECH CHANGE: 0
HEMOPTYSIS: 0
CLAUDICATION: 0

## 2024-11-09 ASSESSMENT — LIFESTYLE VARIABLES: SUBSTANCE_ABUSE: 0

## 2024-11-09 NOTE — PROGRESS NOTES
"Subjective:   Carlos Ibanez is a 59 y.o. male who presents for Anxiety (X 2 weeks)      Patient presents today with complaints of ongoing anxiety, posttraumatic stress syndrome, and recent upper respiratory illness.  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  Patient states at that time he started being triggered whenever he himself would feel sick and have the feeling that \"he was going to get COVID or get sick and possibly die,\" like the patient's he has been seen.  Patient at that time had been seen by a primary care and mental health provider and was on what he believes to be bupropion.  He states that he had significant improvement in his anxiety to the point where it was more or less nonexistent, and his provider weaned him off.  He states he has had occasional bouts here and there since being off medication, but usually he is able to distract himself calm himself down.  He states that his triggers are being sick himself, as well as being in close rooms as these remind him of when he was traveling during COVID and would be sick in his small hotel room.  In the last 2 weeks patient has been triggered often to the point where he is losing sleep unable to stop thinking about his own symptoms and worry thinking about previous experiences during COVID.  He has been seen twice in an ER for symptoms where they had given him 1 dose of Ativan each time and sent him home with 10 mg hydroxyzine 3 times daily as needed.  Patient states that the hydroxyzine has not been completely successful and that is why he is coming today.  [facility] and he has an appointment next week with a psychologist, though he is worried because he does not think they will be able to prescribe him medications " as they are not able to.  He denies any self-harm, any history of self-harm or suicidal attempts, he denies current suicidal ideation, denies homicidal ideation.  Of note patient does have what sounds like an upper respiratory infection that he for started having symptoms for approximately 5 days ago, he was seen by a different facility while on an upcoming trip was prescribed levofloxacin.  States he is currently still taking it    Anxiety  Presents for initial visit. Onset was 1 to 5 years ago. The problem has been waxing and waning. Symptoms include excessive worry, insomnia, muscle tension, nervous/anxious behavior, panic and restlessness. Patient reports no chest pain, compulsions, confusion, decreased concentration, depressed mood, dizziness, dry mouth, feeling of choking, hyperventilation, impotence, irritability, malaise, nausea, obsessions, palpitations, shortness of breath or suicidal ideas. Symptoms occur most days. The severity of symptoms is interfering with daily activities and causing significant distress. The symptoms are aggravated by specific phobias and work stress. The quality of sleep is poor. Nighttime awakenings: occasional.     Risk factors include prior traumatic experience and recent illness. His past medical history is significant for anxiety/panic attacks. There is no history of anemia, arrhythmia, asthma, bipolar disorder, CAD, CHF, chronic lung disease, depression, fibromyalgia, hyperthyroidism or suicide attempts. Past treatments include benzodiazephines and non-SSRI antidepressants. The treatment provided significant relief. Compliance with prior treatments has been variable. Prior compliance problems include insurance issues.       Review of Systems   Constitutional:  Negative for chills, fever and irritability.   HENT:  Positive for congestion. Negative for sore throat.    Respiratory:  Positive for cough. Negative for hemoptysis, sputum production, shortness of breath and wheezing.     Cardiovascular:  Negative for chest pain, palpitations, orthopnea, claudication, leg swelling and PND.   Gastrointestinal:  Negative for diarrhea, nausea and vomiting.   Genitourinary:  Negative for impotence.   Skin:  Negative for rash.   Neurological:  Negative for dizziness, tingling, tremors, sensory change, speech change, focal weakness, seizures, loss of consciousness, weakness and headaches.   Psychiatric/Behavioral:  Negative for confusion, decreased concentration, depression, hallucinations, memory loss, substance abuse and suicidal ideas. The patient is nervous/anxious and has insomnia.      Refer to HPI for additional details.    During this visit, appropriate PPE was worn, and hand hygiene was performed.    PMH:  has no past medical history on file.    MEDS:   Current Outpatient Medications:     LORazepam (ATIVAN) 1 MG Tab, Take 1 Tablet by mouth every 8 hours as needed for Anxiety for up to 3 days., Disp: 6 Tablet, Rfl: 0    benzonatate (TESSALON) 100 MG Cap, Take 1 Capsule by mouth 3 times a day as needed for Cough., Disp: 60 Capsule, Rfl: 0    hydrOXYzine HCl (ATARAX) 25 MG Tab, Take 1 Tablet by mouth at bedtime as needed for Anxiety (use first for symptoms related to anxiety) for up to 30 days., Disp: 15 Tablet, Rfl: 0    omeprazole (PRILOSEC) 40 MG delayed-release capsule, Take 40 mg by mouth., Disp: , Rfl:     triazolam (HALCION) 0.25 MG Tab, BRING ALL 3 TABLETS TO DENTAL APPOINTMENT AND TAKE WHEN INSTRUCTED (NOT COVERED) (Patient not taking: Reported on 11/9/2024), Disp: , Rfl:     methylPREDNISolone (MEDROL DOSEPAK) 4 MG Tablet Therapy Pack, TAKE 6 TABLETS ON DAY 1 AS DIRECTED ON PACKAGE AND DECREASE BY 1 TAB EACH DAY FOR A TOTAL OF 6 DAYS (Patient not taking: Reported on 11/9/2024), Disp: , Rfl:     ALLERGIES:   Allergies   Allergen Reactions    Penicillins Rash     Other Reaction(s): Unknown     SURGHX: History reviewed. No pertinent surgical history.  SOCHX:  reports that he has never  "smoked. He has never used smokeless tobacco.    FH: Per HPI as applicable/pertinent.    Medications, Allergies, and current problem list reviewed today in Epic.     Objective:     /84   Pulse 96   Temp 36.6 °C (97.9 °F) (Temporal)   Resp 18   Ht 1.727 m (5' 8\")   Wt 83.9 kg (185 lb)   SpO2 96%     Physical Exam  Vitals reviewed.   Constitutional:       General: He is not in acute distress.     Appearance: Normal appearance. He is not ill-appearing or diaphoretic.   HENT:      Head: Normocephalic and atraumatic.      Right Ear: Tympanic membrane, ear canal and external ear normal.      Left Ear: Tympanic membrane, ear canal and external ear normal.      Nose: Congestion and rhinorrhea present.      Mouth/Throat:      Mouth: Mucous membranes are moist.      Pharynx: Oropharynx is clear. No oropharyngeal exudate or posterior oropharyngeal erythema.   Eyes:      General: No scleral icterus.     Conjunctiva/sclera: Conjunctivae normal.      Pupils: Pupils are equal, round, and reactive to light.   Cardiovascular:      Rate and Rhythm: Normal rate and regular rhythm.      Pulses: Normal pulses.      Heart sounds: Normal heart sounds.   Pulmonary:      Effort: Pulmonary effort is normal. No respiratory distress.      Breath sounds: Normal breath sounds. No stridor. No wheezing, rhonchi or rales.   Chest:      Chest wall: No tenderness.   Abdominal:      General: Abdomen is flat. Bowel sounds are normal. There is no distension.      Palpations: Abdomen is soft. There is no mass.      Tenderness: There is no abdominal tenderness. There is no right CVA tenderness, left CVA tenderness, guarding or rebound.      Hernia: No hernia is present.   Musculoskeletal:         General: Normal range of motion.      Cervical back: Normal range of motion. No tenderness.   Lymphadenopathy:      Cervical: No cervical adenopathy.   Skin:     General: Skin is warm and dry.      Capillary Refill: Capillary refill takes less than 2 " seconds.      Coloration: Skin is not jaundiced or pale.   Neurological:      General: No focal deficit present.      Mental Status: He is alert and oriented to person, place, and time.   Psychiatric:         Attention and Perception: Attention and perception normal.         Mood and Affect: Mood is anxious.         Speech: Speech normal.         Behavior: Behavior normal. Behavior is cooperative.         Thought Content: Thought content normal. Thought content is not paranoid or delusional. Thought content does not include homicidal or suicidal ideation. Thought content does not include homicidal plan.         Cognition and Memory: Cognition normal.         Judgment: Judgment normal. Judgment is not impulsive or inappropriate.         Assessment/Plan:     Diagnosis and associated orders:     1. Anxiety disorder due to general medical condition with panic attack  - LORazepam (ATIVAN) 1 MG Tab; Take 1 Tablet by mouth every 8 hours as needed for Anxiety for up to 3 days.  Dispense: 6 Tablet; Refill: 0  - hydrOXYzine HCl (ATARAX) 25 MG Tab; Take 1 Tablet by mouth at bedtime as needed for Anxiety (use first for symptoms related to anxiety) for up to 30 days.  Dispense: 15 Tablet; Refill: 0  - Referral to establish with PCP  - Referral to Behavioral Health    2. Post-traumatic stress reaction  - LORazepam (ATIVAN) 1 MG Tab; Take 1 Tablet by mouth every 8 hours as needed for Anxiety for up to 3 days.  Dispense: 6 Tablet; Refill: 0  - hydrOXYzine HCl (ATARAX) 25 MG Tab; Take 1 Tablet by mouth at bedtime as needed for Anxiety (use first for symptoms related to anxiety) for up to 30 days.  Dispense: 15 Tablet; Refill: 0  - Referral to establish with PCP  - Referral to Behavioral Health    3. Upper respiratory tract infection, unspecified type  - benzonatate (TESSALON) 100 MG Cap; Take 1 Capsule by mouth 3 times a day as needed for Cough.  Dispense: 60 Capsule; Refill: 0     Comments/MDM:     Patient history and physical exam  are consistent with multiple system etiologies with multiple system complaints and symptoms.  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  In clinic he shows no acute distress, he is appropriate, and has been making active changes to try and work on his symptoms such [facility], [facility], and as needed medication.  Patient states this has not been as effective in the past couple weeks to the point where he is starting to affect his sleep as well as ADLs.  It does sound like a lot of this was read triggered or at least intensified with his recent illness.  I had an extensive conversation with the patient, I do empathize with him as I can tell that the symptoms are very troublesome for him and causing him distress.  He does not show any signs of active suicidal ideation, homicidal ideation, nor has he had any of these issues in the past.  I do have a high suspicion that patient is suffering from posttraumatic stress disorder related to what he is seen as a CRNA during COVID.  During her conversation we spoke at length about different treatment modalities and the chronicity related to anxiety and posttraumatic stress especially while he continues to work as a CRNA.  Patient is willing and active in his health and would like to get treatment though does seem he has had some barriers as [facility] which is taken some time.  I suggest with patient since he does have other insurance and is willing to go through other avenues aside from [facility] that we also set up and plan for establishing care with a PCP as well as behavioral health referral for treatment and evaluation.  Patient is amicable to this plan.  I discussed what we can do in the meantime to help with his symptoms.  I did tell him that behavioral health does have a quick turnaround and I would also need him  "and want him to call to set up appointment to help get him in quicker.  Although patient was on bupropion previously and that has been good for him, I will hold off on prescribing this as I would like him to have a full mental health evaluation as he may be better managed with different medication.  I did tell patient that we can increase his dose of hydroxyzine and to take it as needed at night for anxiety as this is when it seems most of his symptoms are occurring.  I will write the patient for very small supply of as needed Ativan only to be taken if he is not getting relief from first-line hydroxyzine.  I told patient I will only be doing enough to get him through this weekend as he should be able to be seen next week.  Patient is understanding of this plan, and agrees with the plan.  I did advise him to not be taking this anytime he needs to drive or operate machinery, and do not take it before work to try and allow for at least 8 hours after taking it before he has to work.  Patient also understands this  For his upper respiratory infection, he already is on the levofloxacin, I told him he can continue this as it was prescribed by the previous provider I will also add benzonatate to help with his cough as it seems that this is a big trigger when he goes to lay down start coughing and feels like he is getting \"even sicker.\"  ED precautions given for suicidal ideation, homicidal ideation, worsening panicking anxiety, or any other worrisome changes to his mental health.  I told him if these do, to go straight to the ER and do not wait for appointment.  ER precautions were also given for any chest pain that does not resolve with relaxation or rest, wheezing, stridor or any throat swelling  Discussion and collaborative decision making used with the patient myself to develop treatment plan.  Patient understands the treatment plan of care, and further follow-up if needed.  No further questions           Differential " diagnosis, natural history, supportive care, and indications for immediate follow-up discussed.    Advised the patient to follow-up with the primary care physician for recheck, reevaluation, and consideration of further management.    Please note that this dictation was created using voice recognition software. I have made a reasonable attempt to correct obvious errors, but I expect that there are errors of grammar and possibly content that I did not discover before finalizing the note.    This note was electronically signed by MINA Concepcion

## 2024-11-12 SDOH — ECONOMIC STABILITY: TRANSPORTATION INSECURITY
IN THE PAST 12 MONTHS, HAS THE LACK OF TRANSPORTATION KEPT YOU FROM MEDICAL APPOINTMENTS OR FROM GETTING MEDICATIONS?: NO

## 2024-11-12 SDOH — ECONOMIC STABILITY: FOOD INSECURITY: WITHIN THE PAST 12 MONTHS, THE FOOD YOU BOUGHT JUST DIDN'T LAST AND YOU DIDN'T HAVE MONEY TO GET MORE.: NEVER TRUE

## 2024-11-12 SDOH — HEALTH STABILITY: MENTAL HEALTH
STRESS IS WHEN SOMEONE FEELS TENSE, NERVOUS, ANXIOUS, OR CAN'T SLEEP AT NIGHT BECAUSE THEIR MIND IS TROUBLED. HOW STRESSED ARE YOU?: VERY MUCH

## 2024-11-12 SDOH — ECONOMIC STABILITY: TRANSPORTATION INSECURITY
IN THE PAST 12 MONTHS, HAS LACK OF TRANSPORTATION KEPT YOU FROM MEETINGS, WORK, OR FROM GETTING THINGS NEEDED FOR DAILY LIVING?: NO

## 2024-11-12 SDOH — ECONOMIC STABILITY: FOOD INSECURITY: WITHIN THE PAST 12 MONTHS, YOU WORRIED THAT YOUR FOOD WOULD RUN OUT BEFORE YOU GOT MONEY TO BUY MORE.: NEVER TRUE

## 2024-11-12 SDOH — HEALTH STABILITY: PHYSICAL HEALTH: ON AVERAGE, HOW MANY DAYS PER WEEK DO YOU ENGAGE IN MODERATE TO STRENUOUS EXERCISE (LIKE A BRISK WALK)?: 3 DAYS

## 2024-11-12 SDOH — ECONOMIC STABILITY: INCOME INSECURITY: HOW HARD IS IT FOR YOU TO PAY FOR THE VERY BASICS LIKE FOOD, HOUSING, MEDICAL CARE, AND HEATING?: NOT HARD AT ALL

## 2024-11-12 SDOH — ECONOMIC STABILITY: HOUSING INSECURITY
IN THE LAST 12 MONTHS, WAS THERE A TIME WHEN YOU DID NOT HAVE A STEADY PLACE TO SLEEP OR SLEPT IN A SHELTER (INCLUDING NOW)?: NO

## 2024-11-12 SDOH — ECONOMIC STABILITY: TRANSPORTATION INSECURITY
IN THE PAST 12 MONTHS, HAS LACK OF RELIABLE TRANSPORTATION KEPT YOU FROM MEDICAL APPOINTMENTS, MEETINGS, WORK OR FROM GETTING THINGS NEEDED FOR DAILY LIVING?: NO

## 2024-11-12 SDOH — ECONOMIC STABILITY: INCOME INSECURITY: IN THE LAST 12 MONTHS, WAS THERE A TIME WHEN YOU WERE NOT ABLE TO PAY THE MORTGAGE OR RENT ON TIME?: NO

## 2024-11-12 SDOH — HEALTH STABILITY: PHYSICAL HEALTH: ON AVERAGE, HOW MANY MINUTES DO YOU ENGAGE IN EXERCISE AT THIS LEVEL?: 30 MIN

## 2024-11-12 ASSESSMENT — LIFESTYLE VARIABLES
SKIP TO QUESTIONS 9-10: 1
AUDIT-C TOTAL SCORE: 4
HOW OFTEN DO YOU HAVE A DRINK CONTAINING ALCOHOL: 4 OR MORE TIMES A WEEK
HOW OFTEN DO YOU HAVE SIX OR MORE DRINKS ON ONE OCCASION: NEVER
HOW MANY STANDARD DRINKS CONTAINING ALCOHOL DO YOU HAVE ON A TYPICAL DAY: 1 OR 2

## 2024-11-12 ASSESSMENT — SOCIAL DETERMINANTS OF HEALTH (SDOH)
HOW OFTEN DO YOU ATTEND CHURCH OR RELIGIOUS SERVICES?: NEVER
DO YOU BELONG TO ANY CLUBS OR ORGANIZATIONS SUCH AS CHURCH GROUPS UNIONS, FRATERNAL OR ATHLETIC GROUPS, OR SCHOOL GROUPS?: NO
HOW OFTEN DO YOU HAVE A DRINK CONTAINING ALCOHOL: 4 OR MORE TIMES A WEEK
IN A TYPICAL WEEK, HOW MANY TIMES DO YOU TALK ON THE PHONE WITH FAMILY, FRIENDS, OR NEIGHBORS?: ONCE A WEEK
HOW OFTEN DO YOU GET TOGETHER WITH FRIENDS OR RELATIVES?: NEVER
DO YOU BELONG TO ANY CLUBS OR ORGANIZATIONS SUCH AS CHURCH GROUPS UNIONS, FRATERNAL OR ATHLETIC GROUPS, OR SCHOOL GROUPS?: NO
WITHIN THE PAST 12 MONTHS, YOU WORRIED THAT YOUR FOOD WOULD RUN OUT BEFORE YOU GOT THE MONEY TO BUY MORE: NEVER TRUE
HOW OFTEN DO YOU ATTEND CHURCH OR RELIGIOUS SERVICES?: NEVER
HOW HARD IS IT FOR YOU TO PAY FOR THE VERY BASICS LIKE FOOD, HOUSING, MEDICAL CARE, AND HEATING?: NOT HARD AT ALL
HOW MANY DRINKS CONTAINING ALCOHOL DO YOU HAVE ON A TYPICAL DAY WHEN YOU ARE DRINKING: 1 OR 2
IN A TYPICAL WEEK, HOW MANY TIMES DO YOU TALK ON THE PHONE WITH FAMILY, FRIENDS, OR NEIGHBORS?: ONCE A WEEK
HOW OFTEN DO YOU HAVE SIX OR MORE DRINKS ON ONE OCCASION: NEVER
HOW OFTEN DO YOU GET TOGETHER WITH FRIENDS OR RELATIVES?: NEVER
HOW OFTEN DO YOU ATTENT MEETINGS OF THE CLUB OR ORGANIZATION YOU BELONG TO?: NEVER
IN THE PAST 12 MONTHS, HAS THE ELECTRIC, GAS, OIL, OR WATER COMPANY THREATENED TO SHUT OFF SERVICE IN YOUR HOME?: NO
HOW OFTEN DO YOU ATTENT MEETINGS OF THE CLUB OR ORGANIZATION YOU BELONG TO?: NEVER

## 2024-11-13 ENCOUNTER — OFFICE VISIT (OUTPATIENT)
Dept: MEDICAL GROUP | Facility: LAB | Age: 59
End: 2024-11-13
Payer: COMMERCIAL

## 2024-11-13 VITALS
RESPIRATION RATE: 13 BRPM | SYSTOLIC BLOOD PRESSURE: 130 MMHG | HEIGHT: 68 IN | BODY MASS INDEX: 28.04 KG/M2 | WEIGHT: 185 LBS | TEMPERATURE: 97.6 F | DIASTOLIC BLOOD PRESSURE: 76 MMHG | HEART RATE: 100 BPM | OXYGEN SATURATION: 95 %

## 2024-11-13 DIAGNOSIS — Z76.89 ENCOUNTER TO ESTABLISH CARE: ICD-10-CM

## 2024-11-13 DIAGNOSIS — Z13.29 SCREENING FOR THYROID DISORDER: ICD-10-CM

## 2024-11-13 DIAGNOSIS — F41.9 ANXIETY: ICD-10-CM

## 2024-11-13 DIAGNOSIS — Z13.220 LIPID SCREENING: ICD-10-CM

## 2024-11-13 DIAGNOSIS — Z13.1 DIABETES MELLITUS SCREENING: ICD-10-CM

## 2024-11-13 PROCEDURE — 3075F SYST BP GE 130 - 139MM HG: CPT | Performed by: PHYSICIAN ASSISTANT

## 2024-11-13 PROCEDURE — 99214 OFFICE O/P EST MOD 30 MIN: CPT | Performed by: PHYSICIAN ASSISTANT

## 2024-11-13 PROCEDURE — 3078F DIAST BP <80 MM HG: CPT | Performed by: PHYSICIAN ASSISTANT

## 2024-11-13 RX ORDER — BUSPIRONE HYDROCHLORIDE 10 MG/1
10 TABLET ORAL 2 TIMES DAILY
Qty: 180 TABLET | Refills: 0 | Status: SHIPPED | OUTPATIENT
Start: 2024-11-13 | End: 2025-02-11

## 2024-11-13 RX ORDER — TAMSULOSIN HYDROCHLORIDE 0.4 MG/1
0.4 CAPSULE ORAL
COMMUNITY
Start: 2024-10-16

## 2024-11-13 RX ORDER — PROPRANOLOL HYDROCHLORIDE 10 MG/1
10 TABLET ORAL 3 TIMES DAILY
Qty: 90 TABLET | Refills: 0 | Status: SHIPPED | OUTPATIENT
Start: 2024-11-13

## 2024-11-13 RX ORDER — LORAZEPAM 1 MG/1
1 TABLET ORAL NIGHTLY PRN
Qty: 30 TABLET | Refills: 0 | Status: SHIPPED | OUTPATIENT
Start: 2024-11-13 | End: 2024-12-13

## 2024-11-13 ASSESSMENT — PATIENT HEALTH QUESTIONNAIRE - PHQ9: CLINICAL INTERPRETATION OF PHQ2 SCORE: 0

## 2024-11-13 ASSESSMENT — ANXIETY QUESTIONNAIRES
1. FEELING NERVOUS, ANXIOUS, OR ON EDGE: NEARLY EVERY DAY
GAD7 TOTAL SCORE: 12
4. TROUBLE RELAXING: NEARLY EVERY DAY
2. NOT BEING ABLE TO STOP OR CONTROL WORRYING: NEARLY EVERY DAY
IF YOU CHECKED OFF ANY PROBLEMS ON THIS QUESTIONNAIRE, HOW DIFFICULT HAVE THESE PROBLEMS MADE IT FOR YOU TO DO YOUR WORK, TAKE CARE OF THINGS AT HOME, OR GET ALONG WITH OTHER PEOPLE: VERY DIFFICULT
5. BEING SO RESTLESS THAT IT IS HARD TO SIT STILL: SEVERAL DAYS
6. BECOMING EASILY ANNOYED OR IRRITABLE: SEVERAL DAYS
7. FEELING AFRAID AS IF SOMETHING AWFUL MIGHT HAPPEN: NOT AT ALL
3. WORRYING TOO MUCH ABOUT DIFFERENT THINGS: SEVERAL DAYS

## 2024-12-03 ENCOUNTER — APPOINTMENT (OUTPATIENT)
Dept: MEDICAL GROUP | Facility: LAB | Age: 59
End: 2024-12-03
Payer: COMMERCIAL

## 2024-12-03 VITALS
HEIGHT: 68 IN | BODY MASS INDEX: 28.6 KG/M2 | DIASTOLIC BLOOD PRESSURE: 58 MMHG | RESPIRATION RATE: 16 BRPM | WEIGHT: 188.71 LBS | HEART RATE: 102 BPM | SYSTOLIC BLOOD PRESSURE: 110 MMHG | OXYGEN SATURATION: 96 % | TEMPERATURE: 97.7 F

## 2024-12-03 DIAGNOSIS — F41.9 ANXIETY: ICD-10-CM

## 2024-12-03 PROCEDURE — 3074F SYST BP LT 130 MM HG: CPT | Performed by: PHYSICIAN ASSISTANT

## 2024-12-03 PROCEDURE — 3078F DIAST BP <80 MM HG: CPT | Performed by: PHYSICIAN ASSISTANT

## 2024-12-03 PROCEDURE — 99213 OFFICE O/P EST LOW 20 MIN: CPT | Performed by: PHYSICIAN ASSISTANT

## 2024-12-03 RX ORDER — LORAZEPAM 1 MG/1
1 TABLET ORAL NIGHTLY PRN
Qty: 30 TABLET | Refills: 0 | Status: SHIPPED | OUTPATIENT
Start: 2024-12-13 | End: 2025-01-12

## 2024-12-03 RX ORDER — BUSPIRONE HYDROCHLORIDE 15 MG/1
15 TABLET ORAL 2 TIMES DAILY
Qty: 60 TABLET | Refills: 1 | Status: SHIPPED | OUTPATIENT
Start: 2024-12-03

## 2024-12-03 NOTE — PROGRESS NOTES
Subjective:     CC: f/u anxiety    HPI:   Carlos here today with   Verbal consent was acquired by the patient to use [employer] 42[employer] ambient listening note generation during this visit Yes     History of Present Illness  The patient presents for follow-up on anxiety.    He has been on buspirone for approximately 3 weeks, which he takes in conjunction with Ativan at night as needed. He reports that the combination of these medications has been beneficial. However, he has been experiencing nocturnal awakenings. [facility], which includes breathing exercises. He is also taking propranolol as needed. He has noticed some improvement in his symptoms, but they have not completely resolved.  He would like to discuss increasing the dose of the BuSpar          ROS:  Gen: no fevers/chills, no changes in weight  Eyes: no changes in vision  ENT: no sore throat, no hearing loss, no bloody nose  Pulm: no sob, no cough  CV: no chest pain, no palpitations  GI: no nausea/vomiting, no diarrhea  : no dysuria  MSk: no myalgias  Skin: no rash  Neuro: no headaches, no numbness/tingling  Heme/Lymph: no easy bruising    Current Outpatient Medications Ordered in Epic   Medication Sig Dispense Refill    busPIRone (BUSPAR) 15 MG tablet Take 1 Tablet by mouth 2 times a day. 60 Tablet 1    [START ON 12/13/2024] LORazepam (ATIVAN) 1 MG Tab Take 1 Tablet by mouth at bedtime as needed for Anxiety for up to 30 days. Indications: Feeling Anxious 30 Tablet 0    propranolol (INDERAL) 10 MG Tab Take 1 Tablet by mouth 3 times a day. 90 Tablet 0    LORazepam (ATIVAN) 1 MG Tab Take 1 Tablet by mouth at bedtime as needed for Anxiety for up to 30 days. # 30, 0 refills  Indications: Feeling Anxious 30 Tablet 0    tamsulosin (FLOMAX) 0.4 MG capsule Take 0.4 mg by mouth.      omeprazole (PRILOSEC) 40 MG delayed-release capsule Take 40 mg by mouth.       No current Epic-ordered facility-administered medications on  "file.       Objective:     Exam:  /58 (BP Location: Left arm, Patient Position: Sitting)   Pulse (!) 102   Temp 36.5 °C (97.7 °F) (Temporal)   Resp 16   Ht 1.727 m (5' 8\")   Wt 85.6 kg (188 lb 11.4 oz)   SpO2 96%   BMI 28.69 kg/m²  Body mass index is 28.69 kg/m².    Constitutional: Alert, no distress, well-groomed.  Skin: Warm, dry, good turgor, no rashes in visible areas.  Eye: Equal, round and reactive, conjunctiva clear, lids normal.  ENMT: Lips without lesions, good dentition, moist mucous membranes.  Neck: Trachea midline, no masses, no thyromegaly.  Respiratory: Unlabored respiratory effort, no cough.  MSK: Normal gait, moves all extremities.  Neuro: Grossly non-focal.   Psych: Alert and oriented x3, normal affect and mood.        Assessment & Plan:     59 y.o. male with the following -     Assessment & Plan  1. Anxiety.  The dosage of BuSpar will be increased to 15 mg twice daily. Propranolol can be continued during the day if it proves beneficial. The current prescription for lorazepam expires on the 13th, and a new 30-day prescription will be provided, postdated for that date. The prescription will be sent to [facility] in [place]. The [facility] also [facility] and therapy to manage symptoms.    Follow-up  Return in 1 month for follow up.        Problem List Items Addressed This Visit       Anxiety    Relevant Medications    busPIRone (BUSPAR) 15 MG tablet    LORazepam (ATIVAN) 1 MG Tab (Start on 12/13/2024)       I spent a total of 16 minutes with record review, exam, communication with the patient, communication with other providers, and documentation of this encounter.      No follow-ups on file.    Please note that this dictation was created using voice recognition software. I have made every reasonable attempt to correct obvious errors, but there may be errors of grammar and possibly content that I did not discover before finalizing the " note.

## 2024-12-05 DIAGNOSIS — F41.9 ANXIETY: ICD-10-CM

## 2024-12-06 RX ORDER — PROPRANOLOL HYDROCHLORIDE 10 MG/1
10 TABLET ORAL 3 TIMES DAILY
Qty: 270 TABLET | Refills: 1 | Status: SHIPPED | OUTPATIENT
Start: 2024-12-06

## 2025-02-01 ENCOUNTER — PATIENT MESSAGE (OUTPATIENT)
Dept: MEDICAL GROUP | Facility: LAB | Age: 60
End: 2025-02-01
Payer: COMMERCIAL

## 2025-02-01 DIAGNOSIS — F41.9 ANXIETY: ICD-10-CM

## 2025-02-03 DIAGNOSIS — F41.9 ANXIETY: ICD-10-CM

## 2025-02-03 RX ORDER — BUSPIRONE HYDROCHLORIDE 15 MG/1
15 TABLET ORAL 2 TIMES DAILY
Qty: 60 TABLET | Refills: 1 | Status: SHIPPED | OUTPATIENT
Start: 2025-02-03 | End: 2025-02-03

## 2025-02-03 RX ORDER — LORAZEPAM 1 MG/1
1 TABLET ORAL EVERY 4 HOURS PRN
Qty: 30 TABLET | Refills: 0 | Status: SHIPPED | OUTPATIENT
Start: 2025-02-03 | End: 2025-03-05

## 2025-02-03 RX ORDER — BUSPIRONE HYDROCHLORIDE 10 MG/1
20 TABLET ORAL 3 TIMES DAILY
Qty: 540 TABLET | Refills: 0 | Status: SHIPPED | OUTPATIENT
Start: 2025-02-03

## 2025-02-05 ENCOUNTER — APPOINTMENT (OUTPATIENT)
Dept: MEDICAL GROUP | Facility: LAB | Age: 60
End: 2025-02-05
Payer: COMMERCIAL

## 2025-02-28 ENCOUNTER — HOSPITAL ENCOUNTER (OUTPATIENT)
Facility: MEDICAL CENTER | Age: 60
End: 2025-02-28
Payer: COMMERCIAL

## 2025-02-28 ENCOUNTER — OFFICE VISIT (OUTPATIENT)
Dept: URGENT CARE | Facility: CLINIC | Age: 60
End: 2025-02-28
Payer: COMMERCIAL

## 2025-02-28 VITALS
BODY MASS INDEX: 28.49 KG/M2 | OXYGEN SATURATION: 96 % | TEMPERATURE: 97.4 F | SYSTOLIC BLOOD PRESSURE: 124 MMHG | HEART RATE: 84 BPM | WEIGHT: 188 LBS | HEIGHT: 68 IN | DIASTOLIC BLOOD PRESSURE: 70 MMHG | RESPIRATION RATE: 18 BRPM

## 2025-02-28 DIAGNOSIS — R81 GLUCOSE FOUND IN URINE ON EXAMINATION: ICD-10-CM

## 2025-02-28 DIAGNOSIS — N30.00 ACUTE CYSTITIS WITHOUT HEMATURIA: ICD-10-CM

## 2025-02-28 LAB
APPEARANCE UR: CLEAR
BILIRUB UR STRIP-MCNC: NEGATIVE MG/DL
COLOR UR AUTO: NORMAL
GLUCOSE BLD-MCNC: 90 MG/DL (ref 65–99)
GLUCOSE UR STRIP.AUTO-MCNC: NORMAL MG/DL
KETONES UR STRIP.AUTO-MCNC: NEGATIVE MG/DL
LEUKOCYTE ESTERASE UR QL STRIP.AUTO: NEGATIVE
NITRITE UR QL STRIP.AUTO: POSITIVE
PH UR STRIP.AUTO: 6.5 [PH] (ref 5–8)
PROT UR QL STRIP: NEGATIVE MG/DL
RBC UR QL AUTO: NEGATIVE
SP GR UR STRIP.AUTO: 1.01
UROBILINOGEN UR STRIP-MCNC: NORMAL MG/DL

## 2025-02-28 PROCEDURE — 87086 URINE CULTURE/COLONY COUNT: CPT

## 2025-02-28 RX ORDER — SILDENAFIL CITRATE 20 MG/1
TABLET ORAL
COMMUNITY

## 2025-02-28 RX ORDER — PHENAZOPYRIDINE HYDROCHLORIDE 100 MG/1
100 TABLET, FILM COATED ORAL 3 TIMES DAILY PRN
Qty: 16 TABLET | Refills: 0 | Status: SHIPPED | OUTPATIENT
Start: 2025-02-28

## 2025-02-28 RX ORDER — FINASTERIDE 5 MG/1
5 TABLET, FILM COATED ORAL
COMMUNITY
Start: 2025-02-18

## 2025-02-28 RX ORDER — CIPROFLOXACIN 500 MG/1
500 TABLET, FILM COATED ORAL 2 TIMES DAILY
Qty: 14 TABLET | Refills: 0 | Status: SHIPPED | OUTPATIENT
Start: 2025-02-28 | End: 2025-03-07

## 2025-02-28 RX ORDER — DICYCLOMINE HYDROCHLORIDE 10 MG/1
10 CAPSULE ORAL
COMMUNITY
Start: 2024-10-16

## 2025-02-28 ASSESSMENT — ENCOUNTER SYMPTOMS
HEADACHES: 0
WEAKNESS: 0
DOUBLE VISION: 0
SHORTNESS OF BREATH: 0
FEVER: 0
DIARRHEA: 0
NAUSEA: 0
DIZZINESS: 0
VOMITING: 0
TINGLING: 0
MYALGIAS: 0
PALPITATIONS: 0
FLANK PAIN: 0
SORE THROAT: 0
CHILLS: 0
BLURRED VISION: 0
COUGH: 0

## 2025-03-01 NOTE — PROGRESS NOTES
Subjective:   Carlos Ibanez is a 59 y.o. male who presents for UTI (X 3 days/ Bladder spasms, urgency, burning )    Patient presents to the clinic for complaints of burning with urination, urinary urgency, nocturia, bladder spasms x 3 days.  Patient sexually active w/ long-term female partner, monogamous (wife)  History of chronic prostatitis.  Has taken Pyridium as well as Flomax and Proscar. Sees Urology. Has upcoming follow up.   Patient denies chest pain, SOB, pain w/ bowel movements, palpations, hematuria, abdominal pain, back/flank pain, fever, chills, weakness, N/V/D, or dizziness/lightheadedness.     UTI  Pertinent negatives include no chest pain, chills, congestion, coughing, fever, headaches, myalgias, nausea, sore throat, vomiting or weakness.       Review of Systems   Constitutional:  Negative for chills and fever.   HENT:  Negative for congestion, ear pain and sore throat.    Eyes:  Negative for blurred vision and double vision.   Respiratory:  Negative for cough and shortness of breath.    Cardiovascular:  Negative for chest pain and palpitations.   Gastrointestinal:  Negative for diarrhea, nausea and vomiting.   Genitourinary:  Positive for dysuria, frequency and urgency. Negative for flank pain and hematuria.   Musculoskeletal:  Negative for myalgias.   Neurological:  Negative for dizziness, tingling, weakness and headaches.   All other systems reviewed and are negative.    Refer to HPI for additional details.    During this visit, appropriate PPE was worn, and hand hygiene was performed.    PMH:  has a past medical history of Anxiety.    MEDS:   Current Outpatient Medications:     dicyclomine (BENTYL) 10 MG Cap, Take 10 mg by mouth., Disp: , Rfl:     sildenafil (REVATIO) 20 MG tablet, TAKE 3 TABLETS (60 MG) ORALLY DAILY AS NEEDED 1 HOUR BEFORE SEX (MAX 3 TABLETS/DAY), Disp: , Rfl:     ciprofloxacin (CIPRO) 500 MG Tab, Take 1 Tablet by mouth 2 times a day for 7 days., Disp: 14 Tablet, Rfl: 0     "phenazopyridine (PYRIDIUM) 100 MG Tab, Take 1 Tablet by mouth 3 times a day as needed for Moderate Pain or Severe Pain., Disp: 16 Tablet, Rfl: 0    busPIRone (BUSPAR) 10 MG Tab tablet, Take 2 Tablets by mouth 3 times a day., Disp: 540 Tablet, Rfl: 0    LORazepam (ATIVAN) 1 MG Tab, Take 1 Tablet by mouth every four hours as needed for Anxiety for up to 30 days. Indications: Feeling Anxious, Disp: 30 Tablet, Rfl: 0    propranolol (INDERAL) 10 MG Tab, TAKE 1 TABLET BY MOUTH THREE TIMES A DAY, Disp: 270 Tablet, Rfl: 1    tamsulosin (FLOMAX) 0.4 MG capsule, Take 0.4 mg by mouth., Disp: , Rfl:     omeprazole (PRILOSEC) 40 MG delayed-release capsule, Take 40 mg by mouth., Disp: , Rfl:     finasteride (PROSCAR) 5 MG Tab, Take 5 mg by mouth. (Patient not taking: Reported on 2/28/2025), Disp: , Rfl:     ALLERGIES:   Allergies   Allergen Reactions    Penicillins Rash     Other Reaction(s): Unknown     SURGHX:   Past Surgical History:   Procedure Laterality Date    ARTHROPLASTY Right     ankle    OPEN REDUCTION Left     elbow    VASECTOMY       SOCHX:  reports that he has never smoked. He has never used smokeless tobacco. He reports current alcohol use of about 2.4 oz of alcohol per week. He reports that he does not use drugs.    FH: Per HPI as applicable/pertinent.    Medications, Allergies, and current problem list reviewed today in Epic.     Objective:     /70   Pulse 84   Temp 36.3 °C (97.4 °F) (Temporal)   Resp 18   Ht 1.727 m (5' 8\")   Wt 85.3 kg (188 lb)   SpO2 96%     Physical Exam  Constitutional:       Appearance: Normal appearance. He is not ill-appearing or toxic-appearing.   HENT:      Head: Normocephalic.      Right Ear: External ear normal.      Left Ear: External ear normal.      Nose: Nose normal. No congestion or rhinorrhea.      Mouth/Throat:      Mouth: Mucous membranes are moist.      Pharynx: Oropharynx is clear. No oropharyngeal exudate or posterior oropharyngeal erythema.   Eyes:      " General:         Right eye: No discharge.         Left eye: No discharge.      Extraocular Movements: Extraocular movements intact.      Conjunctiva/sclera: Conjunctivae normal.      Pupils: Pupils are equal, round, and reactive to light.   Cardiovascular:      Rate and Rhythm: Normal rate and regular rhythm.      Pulses: Normal pulses.      Heart sounds: Normal heart sounds. No murmur heard.  Pulmonary:      Effort: Pulmonary effort is normal. No respiratory distress.      Breath sounds: Normal breath sounds. No wheezing or rhonchi.   Abdominal:      General: Abdomen is flat. Bowel sounds are normal. There is no distension.      Palpations: Abdomen is soft. There is no mass.      Tenderness: There is no abdominal tenderness. There is no right CVA tenderness, left CVA tenderness, guarding or rebound.      Hernia: No hernia is present.   Musculoskeletal:         General: No signs of injury. Normal range of motion.      Cervical back: Normal range of motion. No rigidity.   Lymphadenopathy:      Cervical: No cervical adenopathy.   Skin:     General: Skin is warm and dry.   Neurological:      General: No focal deficit present.      Mental Status: He is alert and oriented to person, place, and time.      Motor: No weakness.         Assessment/Plan:     Diagnosis and associated orders:     1. Acute cystitis without hematuria  - POCT Urinalysis  - URINE CULTURE(NEW); Future  - ciprofloxacin (CIPRO) 500 MG Tab; Take 1 Tablet by mouth 2 times a day for 7 days.  Dispense: 14 Tablet; Refill: 0  - phenazopyridine (PYRIDIUM) 100 MG Tab; Take 1 Tablet by mouth 3 times a day as needed for Moderate Pain or Severe Pain.  Dispense: 16 Tablet; Refill: 0    2. Glucose found in urine on examination  - POCT Glucose    Other orders  - dicyclomine (BENTYL) 10 MG Cap; Take 10 mg by mouth.  - finasteride (PROSCAR) 5 MG Tab; Take 5 mg by mouth. (Patient not taking: Reported on 2/28/2025)  - sildenafil (REVATIO) 20 MG tablet; TAKE 3 TABLETS  (60 MG) ORALLY DAILY AS NEEDED 1 HOUR BEFORE SEX (MAX 3 TABLETS/DAY)     Comments/MDM:     Reviewed urinalysis results with patient, which showed glucose of 100 and positive for nitrites.  POC glucose checked due to glucosuria: POC glucose was 90.  Discussed that likely etiology of the patient's symptoms is acute cystitis without hematuria.  Cipro 7-day course prescribed the patient as well as Pyridium as needed for dysuria. Discussed proper administration and dosing as well as associated adverse/side effects of prescribed medications.  Advised patient to continue OTC pharmacologic and nonpharmacologic treatment of her symptoms, including but not limited to Tylenol, Motrin, OTC UTI medications, and plenty of rest and fluids.  Urine culture ordered for patient. Discussed that they will receive a phone call or [employer] message from this provider regarding the results of the tests along with any changes with medication or treatment plan as appropriate.  Extensively discussed signs and symptoms of prostatitis, sepsis and pyelonephritis with patient and to seek emergency medical attention/emergency room if any were to occur, difficulty urinating, including but not limited to flank pain, pain with bowel movements, back pain, fever, chills, body aches, dizziness or lightheadedness, palpitations, racing heart rate, hematuria, abdominal pain, nausea/vomiting, or pallor.  Patient agreeable to this plan of care.  All questions answered.   Return to clinic if symptoms persist or worsen.            Differential diagnosis, natural history, supportive care, and indications for immediate follow-up discussed.    Advised the patient to follow-up with the primary care physician for recheck, reevaluation, and consideration of further management.    Instructed patient to seek emergency medical attention via calling EMS or going to the Emergency Room for red flag symptoms, including but not limited to: chest pain, palpitations, fever  greater than 103F, shortness of breath, wheezing, new or worsened numbness/tingling, focal or unilateral weakness, and bloody vomit/stool.     Please note that this dictation was created using voice recognition software. I have made a reasonable attempt to correct obvious errors, but I expect that there are errors of grammar and possibly content that I did not discover before finalizing the note.    This note was electronically signed by MINA France

## 2025-03-03 ENCOUNTER — RESULTS FOLLOW-UP (OUTPATIENT)
Dept: URGENT CARE | Facility: CLINIC | Age: 60
End: 2025-03-03

## 2025-03-03 ENCOUNTER — OFFICE VISIT (OUTPATIENT)
Dept: URGENT CARE | Facility: CLINIC | Age: 60
End: 2025-03-03
Payer: COMMERCIAL

## 2025-03-03 VITALS
HEART RATE: 76 BPM | OXYGEN SATURATION: 98 % | DIASTOLIC BLOOD PRESSURE: 72 MMHG | TEMPERATURE: 97.8 F | SYSTOLIC BLOOD PRESSURE: 102 MMHG | RESPIRATION RATE: 12 BRPM | WEIGHT: 170 LBS | HEIGHT: 68 IN | BODY MASS INDEX: 25.76 KG/M2

## 2025-03-03 DIAGNOSIS — R30.0 DYSURIA: ICD-10-CM

## 2025-03-03 DIAGNOSIS — Z87.438 HISTORY OF PROSTATITIS: ICD-10-CM

## 2025-03-03 DIAGNOSIS — R10.2 SUPRAPUBIC PAIN: ICD-10-CM

## 2025-03-03 LAB
APPEARANCE UR: CLEAR
BACTERIA UR CULT: NORMAL
BILIRUB UR STRIP-MCNC: NEGATIVE MG/DL
COLOR UR AUTO: NORMAL
GLUCOSE UR STRIP.AUTO-MCNC: NEGATIVE MG/DL
KETONES UR STRIP.AUTO-MCNC: NEGATIVE MG/DL
LEUKOCYTE ESTERASE UR QL STRIP.AUTO: NEGATIVE
NITRITE UR QL STRIP.AUTO: POSITIVE
PH UR STRIP.AUTO: 7 [PH] (ref 5–8)
PROT UR QL STRIP: NEGATIVE MG/DL
RBC UR QL AUTO: NEGATIVE
SIGNIFICANT IND 70042: NORMAL
SITE SITE: NORMAL
SOURCE SOURCE: NORMAL
SP GR UR STRIP.AUTO: 1.01
UROBILINOGEN UR STRIP-MCNC: 0.2 MG/DL

## 2025-03-03 PROCEDURE — 3074F SYST BP LT 130 MM HG: CPT | Performed by: NURSE PRACTITIONER

## 2025-03-03 PROCEDURE — 81002 URINALYSIS NONAUTO W/O SCOPE: CPT | Performed by: NURSE PRACTITIONER

## 2025-03-03 PROCEDURE — 3078F DIAST BP <80 MM HG: CPT | Performed by: NURSE PRACTITIONER

## 2025-03-03 PROCEDURE — 99214 OFFICE O/P EST MOD 30 MIN: CPT | Performed by: NURSE PRACTITIONER

## 2025-03-03 ASSESSMENT — ENCOUNTER SYMPTOMS
CONSTIPATION: 0
CHILLS: 0
SORE THROAT: 0
COUGH: 0

## 2025-03-03 NOTE — RESULT ENCOUNTER NOTE
Hello,  I just reviewed the results of your urine culture, which came back negative for any bacterial growth. However, giving your UTI/urology history, I believe it is prudent for you to continue the course of antibiotics that I started and completed through its course. if you have any further questions or concerns, please reach out. If your symptoms becomes severe, please probably visit the emergency department for higher level of care. Otherwise, take care and have a great day.    Parag COLLIER.

## 2025-03-04 NOTE — PROGRESS NOTES
Subjective:     Carlos Ibanez is a 59 y.o. male who presents for Dysuria (Worsening burning sensation)      Presents worsening urinary symptoms. Taking Cipro and Pyridium. Had felt feverish earlier. Feels he can't fully void. No rectal pain. Reports bladder spasms. No testicular pain or swelling. Had felt left flank pain earlier. No concerns for a STI. [facility] As he's had an elevated creatinine.  Has urology.     Dysuria   This is a recurrent problem. The problem has been gradually worsening. Associated symptoms include flank pain. Pertinent negatives include no chills. His past medical history is significant for recurrent UTIs. prostatitis, BPH       Past Medical History:   Diagnosis Date    Anxiety        Past Surgical History:   Procedure Laterality Date    ARTHROPLASTY Right     ankle    OPEN REDUCTION Left     elbow    VASECTOMY         Social History     Socioeconomic History    Marital status:      Spouse name: Not on file    Number of children: Not on file    Years of education: Not on file    Highest education level: Master's degree (e.g., MA, MS, [place], MEd, MSW, ANABELA)   Occupational History    Not on file   Tobacco Use    Smoking status: Never    Smokeless tobacco: Never   Vaping Use    Vaping status: Never Used   Substance and Sexual Activity    Alcohol use: Yes     Alcohol/week: 2.4 oz     Types: 2 Cans of beer, 2 Shots of liquor per week    Drug use: Never    Sexual activity: Yes     Partners: Female     Birth control/protection: Male Sterilization   Other Topics Concern    Not on file   Social History Narrative    Not on file     Social Drivers of Health     Financial Resource Strain: Low Risk  (11/12/2024)    Overall Financial Resource Strain (CARDIA)     Difficulty of Paying Living Expenses: Not hard at all   Food Insecurity: No Food Insecurity (11/12/2024)    Hunger Vital Sign     Worried About Running Out of Food in the Last  "Year: Never true     Ran Out of Food in the Last Year: Never true   Transportation Needs: No Transportation Needs (11/12/2024)    PRAPARE - Transportation     Lack of Transportation (Medical): No     Lack of Transportation (Non-Medical): No   Physical Activity: Insufficiently Active (11/12/2024)    Exercise Vital Sign     Days of Exercise per Week: 3 days     Minutes of Exercise per Session: 30 min   Stress: Stress Concern Present (11/12/2024)    [employer] - Occupational Stress Questionnaire     Feeling of Stress : Very much   Social Connections: Socially Isolated (11/12/2024)    Social Connection and Isolation Panel [NHANES]     Frequency of Communication with Friends and Family: Once a week     Frequency of Social Gatherings with Friends and Family: Never     Attends Christian Services: Never     Active Member of Clubs or Organizations: No     Attends Club or Organization Meetings: Never     Marital Status:    Intimate Partner Violence: Not on file   Housing Stability: Low Risk  (11/12/2024)    Housing Stability Vital Sign     Unable to Pay for Housing in the Last Year: No     Number of Times Moved in the Last Year: 1     Homeless in the Last Year: No        Family History   Problem Relation Age of Onset    Diabetes Father     Hypertension Father     Stroke Father     Colorectal Cancer Paternal Grandmother         Allergies   Allergen Reactions    Penicillins Rash     Other Reaction(s): Unknown       Review of Systems   Constitutional:  Negative for chills.   HENT:  Negative for sore throat.    Respiratory:  Negative for cough.    Gastrointestinal:  Positive for abdominal pain. Negative for constipation.   Genitourinary:  Positive for dysuria and flank pain.   All other systems reviewed and are negative.       Objective:   /72   Pulse 76   Temp 36.6 °C (97.8 °F) (Temporal)   Resp 12   Ht 1.727 m (5' 8\")   Wt 77.1 kg (170 lb)   SpO2 98%   BMI 25.85 kg/m² "     Physical Exam  Vitals reviewed.   Constitutional:       Appearance: He is not toxic-appearing.   Cardiovascular:      Rate and Rhythm: Normal rate.   Pulmonary:      Effort: Pulmonary effort is normal. No respiratory distress.   Abdominal:      Palpations: Abdomen is soft.      Tenderness: There is abdominal tenderness in the suprapubic area. There is no right CVA tenderness or left CVA tenderness.      Comments: Grimacing with palpation.    Skin:     General: Skin is warm and dry.   Neurological:      Mental Status: He is alert and oriented to person, place, and time.         Assessment/Plan:   1. Dysuria  - POCT Urinalysis    2. Suprapubic pain  - POCT Urinalysis    3. History of prostatitis  - POCT Urinalysis    Results for orders placed or performed in visit on 03/03/25   POCT Urinalysis    Collection Time: 03/03/25  6:00 PM   Result Value Ref Range    POC Color ORANGE Negative    POC Appearance CLEAR Negative    POC Glucose NEGATIVE Negative mg/dL    POC Bilirubin NEGATIVE Negative mg/dL    POC Ketones NEGATIVE Negative mg/dL    POC Specific Gravity 1.010 <1.005 - >1.030    POC Blood NEGATIVE Negative    POC Urine PH 7.0 5.0 - 8.0    POC Protein NEGATIVE Negative mg/dL    POC Urobiligen 0.2 Negative (0.2) mg/dL    POC Nitrites POSITIVE Negative    POC Leukocyte Esterase NEGATIVE Negative     Presents for worsening urinary symptoms while on Cipro. Urine culture from 2/289 was negative. Has abdominal TTP on exam. History of BPH and prostatitis. Differential to include retention. Reviewed PCP correspondence, noting negative urine culture, recommendation to continue antibiotics based on history, and advised ED follow up for worsening symptoms. Referred patient to the ED for further evaluation, discussing benefit in evaluating current renal function, and r/o complications.       Differential diagnosis, natural history, supportive care, and indications for immediate follow-up discussed.

## 2025-03-05 ENCOUNTER — APPOINTMENT (OUTPATIENT)
Dept: MEDICAL GROUP | Facility: LAB | Age: 60
End: 2025-03-05
Payer: COMMERCIAL

## 2025-03-06 ENCOUNTER — HOSPITAL ENCOUNTER (OUTPATIENT)
Facility: MEDICAL CENTER | Age: 60
End: 2025-03-06
Attending: UROLOGY
Payer: COMMERCIAL

## 2025-03-06 ENCOUNTER — OFFICE VISIT (OUTPATIENT)
Dept: UROLOGY | Facility: MEDICAL CENTER | Age: 60
End: 2025-03-06
Payer: COMMERCIAL

## 2025-03-06 DIAGNOSIS — N41.0 ACUTE PROSTATITIS: ICD-10-CM

## 2025-03-06 DIAGNOSIS — N30.00 ACUTE CYSTITIS WITHOUT HEMATURIA: ICD-10-CM

## 2025-03-06 LAB
APPEARANCE UR: CLEAR
BILIRUB UR QL STRIP.AUTO: NEGATIVE
COLOR UR: YELLOW
GLUCOSE UR STRIP.AUTO-MCNC: NEGATIVE MG/DL
KETONES UR STRIP.AUTO-MCNC: NEGATIVE MG/DL
LEUKOCYTE ESTERASE UR QL STRIP.AUTO: NEGATIVE
MICRO URNS: NORMAL
NITRITE UR QL STRIP.AUTO: NEGATIVE
PH UR STRIP.AUTO: 6.5 [PH] (ref 5–8)
POC POST-VOID: 23 ML
POC PRE-VOID: NORMAL
PROT UR QL STRIP: NEGATIVE MG/DL
RBC UR QL AUTO: NEGATIVE
SP GR UR STRIP.AUTO: 1.02
UROBILINOGEN UR STRIP.AUTO-MCNC: 0.2 EU/DL

## 2025-03-06 PROCEDURE — 99213 OFFICE O/P EST LOW 20 MIN: CPT | Performed by: UROLOGY

## 2025-03-06 PROCEDURE — 51798 US URINE CAPACITY MEASURE: CPT | Performed by: UROLOGY

## 2025-03-06 PROCEDURE — 81003 URINALYSIS AUTO W/O SCOPE: CPT

## 2025-03-06 RX ORDER — CIPROFLOXACIN 500 MG/1
500 TABLET, FILM COATED ORAL 2 TIMES DAILY
Qty: 42 TABLET | Refills: 0 | Status: SHIPPED | OUTPATIENT
Start: 2025-03-06 | End: 2025-03-27

## 2025-03-06 ASSESSMENT — ENCOUNTER SYMPTOMS
ABDOMINAL PAIN: 1
FLANK PAIN: 1

## 2025-03-06 NOTE — PROGRESS NOTES
Chief Complaint: recurrent prostatitis    HPI: Carlos Iabnez is a 59 y.o. male with a history of recurrent episodes of bacterial prostatitis, here today for follow up after another episode of suspected prostatitis which would be his sixth overall. He noted a recurrence of bladder pain and spasms and dysuria, and has been on ciprofloxacin for one week. He's had some continued dysuria, though, so unclear if the cipro has been effective. No fevers or chills. [facility] but the results are not yet published/available to review.       Past Medical History:  Past Medical History:   Diagnosis Date    Anxiety        Past Surgical History:  Past Surgical History:   Procedure Laterality Date    ARTHROPLASTY Right     ankle    OPEN REDUCTION Left     elbow    VASECTOMY         Family History:  Family History   Problem Relation Age of Onset    Diabetes Father     Hypertension Father     Stroke Father     Colorectal Cancer Paternal Grandmother        Social History:  Social History     Socioeconomic History    Marital status:      Spouse name: Not on file    Number of children: Not on file    Years of education: Not on file    Highest education level: Master's degree (e.g., MA, MS, [place], MEd, MSW, ANABELA)   Occupational History    Not on file   Tobacco Use    Smoking status: Never    Smokeless tobacco: Never   Vaping Use    Vaping status: Never Used   Substance and Sexual Activity    Alcohol use: Yes     Alcohol/week: 2.4 oz     Types: 2 Cans of beer, 2 Shots of liquor per week    Drug use: Never    Sexual activity: Yes     Partners: Female     Birth control/protection: Male Sterilization   Other Topics Concern    Not on file   Social History Narrative    Not on file     Social Drivers of Health     Financial Resource Strain: Low Risk  (11/12/2024)    Overall Financial Resource Strain (CARDIA)     Difficulty of Paying Living Expenses: Not hard at all   Food Insecurity:  No Food Insecurity (11/12/2024)    Hunger Vital Sign     Worried About Running Out of Food in the Last Year: Never true     Ran Out of Food in the Last Year: Never true   Transportation Needs: No Transportation Needs (11/12/2024)    PRAPARE - Transportation     Lack of Transportation (Medical): No     Lack of Transportation (Non-Medical): No   Physical Activity: Insufficiently Active (11/12/2024)    Exercise Vital Sign     Days of Exercise per Week: 3 days     Minutes of Exercise per Session: 30 min   Stress: Stress Concern Present (11/12/2024)    [employer] - Occupational Stress Questionnaire     Feeling of Stress : Very much   Social Connections: Socially Isolated (11/12/2024)    Social Connection and Isolation Panel [NHANES]     Frequency of Communication with Friends and Family: Once a week     Frequency of Social Gatherings with Friends and Family: Never     Attends Congregational Services: Never     Active Member of Clubs or Organizations: No     Attends Club or Organization Meetings: Never     Marital Status:    Intimate Partner Violence: Not on file   Housing Stability: Low Risk  (11/12/2024)    Housing Stability Vital Sign     Unable to Pay for Housing in the Last Year: No     Number of Times Moved in the Last Year: 1     Homeless in the Last Year: No       Medications:  Current Outpatient Medications   Medication Sig Dispense Refill    ciprofloxacin (CIPRO) 500 MG Tab Take 1 Tablet by mouth 2 times a day for 21 days. 42 Tablet 0    dicyclomine (BENTYL) 10 MG Cap Take 10 mg by mouth.      sildenafil (REVATIO) 20 MG tablet TAKE 3 TABLETS (60 MG) ORALLY DAILY AS NEEDED 1 HOUR BEFORE SEX (MAX 3 TABLETS/DAY)      phenazopyridine (PYRIDIUM) 100 MG Tab Take 1 Tablet by mouth 3 times a day as needed for Moderate Pain or Severe Pain. 16 Tablet 0    busPIRone (BUSPAR) 10 MG Tab tablet Take 2 Tablets by mouth 3 times a day. 540 Tablet 0    propranolol (INDERAL) 10 MG Tab TAKE 1 TABLET  BY MOUTH THREE TIMES A  Tablet 1    tamsulosin (FLOMAX) 0.4 MG capsule Take 0.4 mg by mouth.      omeprazole (PRILOSEC) 40 MG delayed-release capsule Take 40 mg by mouth.      finasteride (PROSCAR) 5 MG Tab Take 5 mg by mouth. (Patient not taking: Reported on 2/28/2025)       No current facility-administered medications for this visit.       Allergies:  Allergies   Allergen Reactions    Penicillins Rash     Other Reaction(s): Unknown       Review of Systems:  Constitutional: Negative for fever, chills and malaise/fatigue.   HENT: Negative for congestion.    Eyes: Negative for pain.   Respiratory: Negative for cough and shortness of breath.    Cardiovascular: Negative for leg swelling.   Gastrointestinal: Negative for nausea, vomiting, abdominal pain and diarrhea.   Genitourinary: Negative for dysuria and hematuria.   Skin: Negative for rash.   Neurological: Negative for dizziness, focal weakness and headaches.   Endo/Heme/Allergies: Does not bruise/bleed easily.   Psychiatric/Behavioral: Negative for depression.  The patient is not nervous/anxious.        Physical Exam:  There were no vitals filed for this visit.    GENERAL: well appearing, well nourished, NAD  RESP: respiratory effort normal  ABDOMEN: soft, nontender, nondistended, no masses or organomegaly  HERNIAS: no hernias found on exam  SKIN/LYMPH: normal coloration and turgor, no suspicious skin lesions noted  NEURO/PSYCH: alert, oriented, normal speech, no focal findings or movement disorder noted  EXTREMITIES: no pedal edema noted  GENITOURINARY: normal male external genitalia, penis is normal, testes descended bilaterally, no testicular masses or scrotal swelling, and testes nontender. Moderate tenderness to palpation over bladder.   RECTAL: Exam Deferred    PVR: 23 mL  (bladder scanner)     Data Review:    Labs:  POCT UA   Lab Results   Component Value Date/Time    POCCOLOR ORANGE 03/03/2025 06:00 PM    POCAPPEAR CLEAR 03/03/2025 06:00 PM     POCLEUKEST NEGATIVE 03/03/2025 06:00 PM    POCNITRITE POSITIVE 03/03/2025 06:00 PM    POCUROBILIGE 0.2 03/03/2025 06:00 PM    POCPROTEIN NEGATIVE 03/03/2025 06:00 PM    POCURPH 7.0 03/03/2025 06:00 PM    POCBLOOD NEGATIVE 03/03/2025 06:00 PM    POCSPGRV 1.010 03/03/2025 06:00 PM    POCKETONES NEGATIVE 03/03/2025 06:00 PM    POCBILIRUBIN NEGATIVE 03/03/2025 06:00 PM    POCGLUCUA NEGATIVE 03/03/2025 06:00 PM        Assessment: 59 y.o.male with a history of recurrent complicated UTI's including prostatitis, here for follow up after another episode of symptoms including dysuria and bladder pain, as well as a urinalysis with positive nitrites. POCT urinalysis today has persistent postive nitrites despite a week of ciprofloxacin.     We discussed the various risk factors for recurrent urinary tract infections in men, and will plan on office cystoscopy to assess for anatomical risks, outlet obstruction, stones, etc. If there is any concern on his recent renal ultrasound, we can also obtain more detailed imaging of the upper tracts with CT or MRI.       Plan:    -Complete 1 month of antibiotics for prostatitis; will change antibiotic if needed based on urine culture sent from the office today (though may return negative as he's already been treated for one week)  -Cystoscopy in about one month    Bala Shrestha MD

## 2025-03-20 NOTE — PROGRESS NOTES
Subjective:     CC:  Diagnoses of Encounter to establish care, Lipid screening, Screening for thyroid disorder, and Diabetes mellitus screening were pertinent to this visit.    HISTORY OF THE PRESENT ILLNESS: Patient is a 59 y.o. male. This pleasant patient is here today to establish care Verbal consent was acquired by the patient to use [employer] ambient listening note generation during this visit Yes     History of Present Illness  The patient presents for evaluation of anxiety.    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 His anxiety began during the COVID-19 pandemic, exacerbated by the stress of his job and personal life. He experienced episodes of anxiety characterized by feelings of impending doom, sweating, and difficulty breathing.  These episodes are often triggered by upper respiratory infections as they trigger/remind the patient of his experience with COVID and treating COVID patients.  These episodes were initially managed with buspirone, which he found helpful.  This medication was discontinued after about 3 months as the patient felt like his anxiety had improved.  However, his anxiety has recently worsened, leading to an episode where he felt unable to breathe and had to seek emergency care. He was given Ativan in the ER, which helped him sleep. He has also been prescribed hydroxyzine, but he finds it makes him drowsy without alleviating his anxiety. [facility].    He has no history of chronic medical issues and maintains a healthy lifestyle, including regular exercise and a balanced diet. He has never been diagnosed with sleep apnea, but he does experience reflux. He is up to date on his dental and eye exams, as well as [facility]. He has a history of polyps and a strong family history of colorectal cancer, so he undergoes colonoscopies every 5 years. He has never had a skin cancer  screening but is interested in getting one. He has had two COVID-19 vaccines and has been screened for hepatitis C and HIV.    He had arthroplasty, open reduction of the elbow, and a vasectomy. He had right ankle surgery about 20 years ago in the .    SOCIAL HISTORY  He denies tobacco use. He drinks 2 to 4 drinks a week.    FAMILY HISTORY  His father has diabetes, high blood pressure, and stroke. His paternal grandmother had colorectal cancer in her 70s.    ALLERGIES  He is allergic to PENICILLIN.    IMMUNIZATIONS  He is up to date on his Tdap, shingles, and influenza vaccine.          11/13/2024     1:17 PM    DELMI-7 ANXIETY SCALE FLOWSHEET   Feeling nervous, anxious, or on edge 3   Not being able to stop or control worrying 3   Worrying too much about different things 1   Trouble relaxing 3   Being so restless that it is hard to sit still 1   Becoming easily annoyed or irritable 1   Feeling afraid as if something awful might happen 0   DELMI-7 Total Score 12   How difficult have these problems made it for you to do your work, take care of things at home, or get along with other people? Very difficult       Interpretation of DELMI-7 Total Score   Score Severity   0-4 Minimal Anxiety  5-9 Mild Anxiety   10-14 Moderate Anxiety  15-21 Severy Anxiety          Health Maintenance     - Dyslipidemia (30-45): ordered  - Diabetes (HTN, HLD, BMI >25): ordered  - Depression screening (PHQ-2 and/or PHQ-9): neg  - Dental: UTD  - Eye: UTD  Diet: regular  Exercise: works out at home several times per week  Sleep: some gasping  Substance Use: denies  Tobacco Use/counseling: denies     Cancer screening  - Colon CA (45-75) - FIT (annual) cspy (q10yr): completed 2021, 5 year recall  - Prostate Cancer Screening/PSA: [facility]  - Skin cancer screening: denies suspicious skin lesions     Infectious disease screening/Immunizations  --STI/HIV Screening: Previously completed  --Practices safe sex.  --Hepatitis C Screening (18 to  "78 yo): Previously completed  --Immunizations: Up-to-date    Current Outpatient Medications Ordered in Epic   Medication Sig Dispense Refill    hydrOXYzine HCl (ATARAX) 25 MG Tab Take 1 Tablet by mouth at bedtime as needed for Anxiety (use first for symptoms related to anxiety) for up to 30 days. 15 Tablet 0    omeprazole (PRILOSEC) 40 MG delayed-release capsule Take 40 mg by mouth.      benzonatate (TESSALON) 100 MG Cap Take 1 Capsule by mouth 3 times a day as needed for Cough. (Patient not taking: Reported on 11/13/2024) 60 Capsule 0    triazolam (HALCION) 0.25 MG Tab BRING ALL 3 TABLETS TO DENTAL APPOINTMENT AND TAKE WHEN INSTRUCTED (NOT COVERED) (Patient not taking: Reported on 11/9/2024)      methylPREDNISolone (MEDROL DOSEPAK) 4 MG Tablet Therapy Pack TAKE 6 TABLETS ON DAY 1 AS DIRECTED ON PACKAGE AND DECREASE BY 1 TAB EACH DAY FOR A TOTAL OF 6 DAYS (Patient not taking: Reported on 11/9/2024)       No current [facility]-ordered facility-administered medications on file.       ROS:   Gen: no fevers/chills, no changes in weight  Eyes: no changes in vision  ENT: no sore throat, no hearing loss, no bloody nose  Pulm: no sob, no cough  CV: no chest pain, no palpitations  GI: no nausea/vomiting, no diarrhea  : no dysuria  MSk: no myalgias  Skin: no rash  Neuro: no headaches, no numbness/tingling  Heme/Lymph: no easy bruising      Objective:     Exam: /76   Pulse 100   Temp 36.4 °C (97.6 °F)   Resp 13   Ht 1.727 m (5' 8\")   Wt 83.9 kg (185 lb)   SpO2 95%  Body mass index is 28.13 kg/m².    General: Normal appearing. No distress.  HEENT: Normocephalic. Eyes conjunctiva clear lids without ptosis, pupils equal and reactive to light accommodation, ears normal shape and contour, canals are clear bilaterally, tympanic membranes are benign, nasal mucosa benign, oropharynx is without erythema, edema or exudates.   Neck: Supple without JVD or bruit. Thyroid is not enlarged.  Pulmonary: Clear to ausculation.  Normal " effort. No rales, ronchi, or wheezing.  Cardiovascular: Regular rate and rhythm without murmur. Carotid and radial pulses are intact and equal bilaterally.  Abdomen: Soft, nontender, nondistended. Normal bowel sounds. Liver and spleen are not palpable  Neurologic: Grossly nonfocal  Lymph: No cervical or supraclavicular lymph nodes are palpable  Skin: Warm and dry.  No obvious lesions.  Musculoskeletal: Normal gait. No extremity cyanosis, clubbing, or edema.  Psych: Normal mood and affect. Alert and oriented x3. Judgment and insight is normal.      Assessment & Plan:   59 y.o. male with the following -    Assessment & Plan  1. Anxiety.  The patient reports experiencing anxiety symptoms exacerbated by his work as a CRNA during the Covid pandemic. He describes episodes of chest pain, shortness of breath, and difficulty sleeping. Previous treatment with buspirone was helpful. He will be started on buspirone 10 mg twice a day. Propranolol will be prescribed for daytime anxiety to help manage physiological symptoms such as chest tightness and sweaty palms. Ativan 1 mg will be prescribed for use at bedtime as needed, with a supply of 30 tablets. He is advised to try taking half a tablet of Ativan to see if it is sufficient.  Patient is aware of use indications and contraindications to Ativan.  The patient is also encouraged to engage in cognitive therapy to develop coping mechanisms.  He will follow-up in clinic in 1 month for reassessment  Prescriptions will be sent to [facility] in [place].    2. Medication Management.  The patient is currently taking omeprazole and Flomax. These medications will be continued as prescribed.    Follow-up  Return in 1 month for follow up.        1. Encounter to establish care        2. Anxiety  busPIRone (BUSPAR) 10 MG Tab tablet    propranolol (INDERAL) 10 MG Tab    LORazepam (ATIVAN) 1 MG Tab      3. Lipid screening        4. Screening for thyroid disorder        5. Diabetes mellitus screening               I spent a total of 30 minutes with record review, exam, communication with the patient, communication with other providers, and documentation of this encounter.    No follow-ups on file.    Please note that this dictation was created using voice recognition software. I have made every reasonable attempt to correct obvious errors, but I expect that there are errors of grammar and possibly content that I did not discover before finalizing the note.   no

## 2025-04-02 ENCOUNTER — PATIENT MESSAGE (OUTPATIENT)
Dept: UROLOGY | Facility: MEDICAL CENTER | Age: 60
End: 2025-04-02
Payer: COMMERCIAL

## 2025-04-02 DIAGNOSIS — N30.00 ACUTE CYSTITIS WITHOUT HEMATURIA: ICD-10-CM

## 2025-04-02 DIAGNOSIS — R30.0 DYSURIA: ICD-10-CM

## 2025-04-10 ENCOUNTER — PROCEDURE VISIT (OUTPATIENT)
Dept: UROLOGY | Facility: MEDICAL CENTER | Age: 60
End: 2025-04-10
Payer: COMMERCIAL

## 2025-04-10 DIAGNOSIS — R31.9 HEMATURIA, UNSPECIFIED TYPE: ICD-10-CM

## 2025-04-10 LAB
APPEARANCE UR: CLEAR
BILIRUB UR STRIP-MCNC: NORMAL MG/DL
COLOR UR AUTO: YELLOW
GLUCOSE UR STRIP.AUTO-MCNC: NORMAL MG/DL
KETONES UR STRIP.AUTO-MCNC: NORMAL MG/DL
LEUKOCYTE ESTERASE UR QL STRIP.AUTO: NORMAL
NITRITE UR QL STRIP.AUTO: NORMAL
PH UR STRIP.AUTO: 6 [PH] (ref 5–8)
PROT UR QL STRIP: NORMAL MG/DL
RBC UR QL AUTO: NORMAL
SP GR UR STRIP.AUTO: 1.01
UROBILINOGEN UR STRIP-MCNC: 0.2 MG/DL

## 2025-04-10 PROCEDURE — 52000 CYSTOURETHROSCOPY: CPT | Performed by: UROLOGY

## 2025-04-10 PROCEDURE — 81002 URINALYSIS NONAUTO W/O SCOPE: CPT | Performed by: UROLOGY

## 2025-04-10 NOTE — PROGRESS NOTES
Flexible Cystoscopy Report    Patient name: Carlos Ibanez    Age: 60 y.o.    Procedure: Flexible cystourethroscopy    Pre-procedure diagnosis: recurrent prostatitis    Post-procedure diagnosis: mild BPH    Procedure indications: Carlos Ibanez is a 60 y.o. male with a history of decreased urinary flow and 6 episodes of prostatitis    Procedure details: After providing a urine sample for a urinalysis to rule out active urinary tract infection, the patient was brought to the procedure room and placed in supine position. The external genitalia were then prepped and draped in usual sterile fashion. Lidocaine jelly was administered via the urethral meatus and held in place for approximately two minutes for local anesthesia.    After a procedure time-out to confirm the proper patient, procedure, consent, and availability of all necessary equipment, the case began by inserting a 16-Scottish flexible cystoscope via the urethral meatus. Findings included:    Anterior urethra: normal  Posterior urethra: normal membranous urethra; prostatic urethra is only about 2.5-3 cm in length and with mild lateral lobe hypertrophy and no median lobe; normal bladder neck  Bladder:  Bladder mucosa normal without masses, diverticuli, or trabeculations. No stones or other foreign bodies noted. Bilateral ureteral orifices were identified and found to have efflux of clear yellow urine.     The cystoscope was then carefully withdrawn from the bladder and urethra. The patient was cleaned and dried and allowed to void.     Assessment/Plan:  Normal lower urinary tract without clear obstructive prostatic hypertrophy. Discussed potential bladder outlet procedures (TURP, PVP, Rezum) but as his bladder outlet is unremarkable on today's cysto I recommended obtaining urodynamic studies to assess for obstructive voiding parameters before deciding on surgical intervention.     -Schedule urodynamic studies  -If consistent with MAYORGA, he's most interested  in Rezum therapy which would require outside referral  Bala Shrestha MD

## 2025-04-11 ENCOUNTER — PATIENT MESSAGE (OUTPATIENT)
Dept: UROLOGY | Facility: MEDICAL CENTER | Age: 60
End: 2025-04-11
Payer: COMMERCIAL

## 2025-04-11 DIAGNOSIS — R30.0 DYSURIA: ICD-10-CM

## 2025-04-11 RX ORDER — PHENAZOPYRIDINE HYDROCHLORIDE 200 MG/1
200 TABLET, FILM COATED ORAL 3 TIMES DAILY PRN
Qty: 6 TABLET | Refills: 0 | Status: SHIPPED | OUTPATIENT
Start: 2025-04-11

## 2025-04-11 RX ORDER — SULFAMETHOXAZOLE AND TRIMETHOPRIM 800; 160 MG/1; MG/1
1 TABLET ORAL 2 TIMES DAILY
Qty: 14 TABLET | Refills: 0 | Status: SHIPPED | OUTPATIENT
Start: 2025-04-11 | End: 2025-04-18

## 2025-04-16 RX ORDER — CELECOXIB 200 MG/1
200 CAPSULE ORAL 2 TIMES DAILY PRN
Qty: 60 CAPSULE | Refills: 0 | Status: SHIPPED | OUTPATIENT
Start: 2025-04-16

## 2025-04-30 DIAGNOSIS — F41.9 ANXIETY: ICD-10-CM

## 2025-04-30 RX ORDER — BUSPIRONE HYDROCHLORIDE 10 MG/1
20 TABLET ORAL 3 TIMES DAILY
Qty: 540 TABLET | Refills: 0 | Status: SHIPPED | OUTPATIENT
Start: 2025-04-30

## 2025-05-05 ENCOUNTER — APPOINTMENT (OUTPATIENT)
Dept: MEDICAL GROUP | Facility: LAB | Age: 60
End: 2025-05-05
Payer: COMMERCIAL

## 2025-05-05 VITALS
DIASTOLIC BLOOD PRESSURE: 60 MMHG | HEIGHT: 68 IN | RESPIRATION RATE: 16 BRPM | OXYGEN SATURATION: 94 % | HEART RATE: 73 BPM | SYSTOLIC BLOOD PRESSURE: 124 MMHG | BODY MASS INDEX: 25.94 KG/M2 | TEMPERATURE: 97.8 F | WEIGHT: 171.2 LBS

## 2025-05-05 DIAGNOSIS — N40.0 ENLARGED PROSTATE: ICD-10-CM

## 2025-05-05 DIAGNOSIS — F41.9 ANXIETY: ICD-10-CM

## 2025-05-05 DIAGNOSIS — G47.33 OBSTRUCTIVE SLEEP APNEA OF ADULT: ICD-10-CM

## 2025-05-05 PROCEDURE — 99214 OFFICE O/P EST MOD 30 MIN: CPT | Performed by: PHYSICIAN ASSISTANT

## 2025-05-05 PROCEDURE — 3074F SYST BP LT 130 MM HG: CPT | Performed by: PHYSICIAN ASSISTANT

## 2025-05-05 PROCEDURE — 3078F DIAST BP <80 MM HG: CPT | Performed by: PHYSICIAN ASSISTANT

## 2025-05-05 RX ORDER — BUSPIRONE HYDROCHLORIDE 10 MG/1
20 TABLET ORAL 3 TIMES DAILY
Qty: 540 TABLET | Refills: 3 | Status: SHIPPED | OUTPATIENT
Start: 2025-05-05

## 2025-05-05 RX ORDER — LORAZEPAM 1 MG/1
1 TABLET ORAL NIGHTLY PRN
Qty: 30 TABLET | Refills: 0 | Status: SHIPPED | OUTPATIENT
Start: 2025-05-05 | End: 2025-06-04

## 2025-05-05 RX ORDER — LORAZEPAM 1 MG/1
1 TABLET ORAL NIGHTLY PRN
Qty: 30 TABLET | Refills: 0 | Status: SHIPPED | OUTPATIENT
Start: 2025-06-04 | End: 2025-07-04

## 2025-05-05 RX ORDER — LORAZEPAM 1 MG/1
1 TABLET ORAL NIGHTLY PRN
Qty: 30 TABLET | Refills: 0 | Status: SHIPPED | OUTPATIENT
Start: 2025-07-04 | End: 2025-08-03

## 2025-05-05 ASSESSMENT — PATIENT HEALTH QUESTIONNAIRE - PHQ9: CLINICAL INTERPRETATION OF PHQ2 SCORE: 0

## 2025-05-05 NOTE — PROGRESS NOTES
"Subjective:     CC: Follow-up anxiety    HPI:   Carlos here today with   Verbal consent was acquired by the patient to use [employer] ambient listening note generation during this visit Yes     History of Present Illness  The patient presents for evaluation of anxiety, sleep apnea, and prostate symptoms.    Anxiety  - Manages anxiety with BuSpar 20mg 3 times daily, which they find effective.  They would like to continue current medication at the current dosage  - [facility] including [facility].  - Propranolol use is infrequent due to lack of symptoms.  - Uses Ativan as needed for acute anxiety, patient notes that he has been using this medication less frequently the BuSpar dosage is \"dialed\".  He does like having it available as a backup for acute anxiety.  PDMP reviewed, [facility]    Sleep Apnea  - Recently diagnosed with severe sleep apnea by the [facility], with 31 episodes per hour.  - Fitted with a CPAP machine last week but experiencing difficulties due to facial pressure from the mask.  - Increased nighttime anxiety due to CPAP machine issues.  - Has not used the machine throughout the night, but is working on machine compliance    Prostate Symptoms  - Had several urology consultations for prostate symptoms.  - Urologist is not convinced symptoms are prostate-related, though there is some prostate intrusion into the urethra.  - Plan to measure bladder pressures to determine if the issue is bladder-related or due to prostate obstruction.    Supplemental information: Underwent colonoscopy in 2021, due for repeat in 2026. Basic labs, including cholesterol, are monitored [facility] and are normal.    FAMILY HISTORY  Paternal grandparents have cancer.         ROS:  All ROS negative except for pertinent positives listed above.     Current Outpatient Medications Ordered in Epic   Medication Sig Dispense Refill    busPIRone (BUSPAR) 10 MG Tab tablet TAKE 2 TABLETS BY MOUTH 3 TIMES " "A  Tablet 0    celecoxib (CELEBREX) 200 MG Cap Take 1 Capsule by mouth 2 times a day as needed for Moderate Pain (urethral pain). 60 Capsule 0    phenazopyridine (PYRIDIUM) 200 MG Tab Take 1 Tablet by mouth 3 times a day as needed for Moderate Pain. 6 Tablet 0    dicyclomine (BENTYL) 10 MG Cap Take 10 mg by mouth.      sildenafil (REVATIO) 20 MG tablet TAKE 3 TABLETS (60 MG) ORALLY DAILY AS NEEDED 1 HOUR BEFORE SEX (MAX 3 TABLETS/DAY)      phenazopyridine (PYRIDIUM) 100 MG Tab Take 1 Tablet by mouth 3 times a day as needed for Moderate Pain or Severe Pain. 16 Tablet 0    propranolol (INDERAL) 10 MG Tab TAKE 1 TABLET BY MOUTH THREE TIMES A  Tablet 1    tamsulosin (FLOMAX) 0.4 MG capsule Take 0.4 mg by mouth.      omeprazole (PRILOSEC) 40 MG delayed-release capsule Take 40 mg by mouth.      finasteride (PROSCAR) 5 MG Tab Take 5 mg by mouth. (Patient not taking: Reported on 5/5/2025)       No current [facility]-ordered facility-administered medications on file.         Objective:     Exam:  /60 (BP Location: Right arm, Patient Position: Sitting, BP Cuff Size: Adult)   Pulse 73   Temp 36.6 °C (97.8 °F) (Temporal)   Resp 16   Ht 1.727 m (5' 8\")   Wt 77.7 kg (171 lb 3.2 oz)   SpO2 94%   BMI 26.03 kg/m²  Body mass index is 26.03 kg/m².    Gen: Alert and oriented, No apparent distress.  Neck: Neck is supple without lymphadenopathy.  Lungs: Normal effort, CTA bilaterally, no wheezes, rhonchi, or rales  CV: Regular rate and rhythm. No murmurs, rubs, or gallops.  Ext: No clubbing, cyanosis, edema.      Assessment & Plan:     60 y.o. male with the following -     1. Anxiety  busPIRone (BUSPAR) 10 MG Tab tablet    LORazepam (ATIVAN) 1 MG Tab    LORazepam (ATIVAN) 1 MG Tab    LORazepam (ATIVAN) 1 MG Tab      2. Obstructive sleep apnea of adult        3. Enlarged prostate            Assessment & Plan  Anxiety  Taking BuSpar 20 mg three times daily and Ativan 1 mg at bedtime as needed for acute anxiety. Current " regimen effective for daytime anxiety. No recent propranolol use due to lack of symptoms.  Treatment plan: Prescriptions for BuSpar with refills and Ativan for 30-day supplies sent to pharmacy. PDMP checked and as expected.    Sleep Apnea  Diagnosed with severe sleep apnea, 31 episodes per hour. Started CPAP but struggling to adjust, increasing nighttime anxiety.  Treatment plan: Advised to continue CPAP and gradually increase usage duration.    Prostate Symptoms  Multiple urology visits for prostate symptoms. Urologist plans to measure bladder pressures to determine if issue is bladder-related or due to prostate obstruction.  Diagnostic plan: Bladder pressure evaluation scheduled for 08/2025.  Treatment plan: Monitor symptoms and follow up with urology as needed.    Health Maintenance  Due for colonoscopy in 2026, last one in 2021. Basic labs, including cholesterol, [facility] and normal.    Follow-up: No immediate health maintenance concerns.      I spent a total of 20 minutes with record review, exam, communication with the patient, communication with other providers, and documentation of this encounter.      No follow-ups on file.    Please note that this dictation was created using voice recognition software. I have made every reasonable attempt to correct obvious errors, but there may be errors of grammar and possibly content that I did not discover before finalizing the note.

## 2025-06-06 ENCOUNTER — PATIENT MESSAGE (OUTPATIENT)
Dept: UROLOGY | Facility: MEDICAL CENTER | Age: 60
End: 2025-06-06
Payer: COMMERCIAL

## 2025-06-06 DIAGNOSIS — N41.0 ACUTE PROSTATITIS: Primary | ICD-10-CM

## 2025-06-09 ENCOUNTER — OFFICE VISIT (OUTPATIENT)
Dept: URGENT CARE | Facility: CLINIC | Age: 60
End: 2025-06-09
Payer: COMMERCIAL

## 2025-06-09 VITALS
HEART RATE: 86 BPM | HEIGHT: 68 IN | BODY MASS INDEX: 25.91 KG/M2 | DIASTOLIC BLOOD PRESSURE: 60 MMHG | TEMPERATURE: 99 F | WEIGHT: 171 LBS | OXYGEN SATURATION: 97 % | SYSTOLIC BLOOD PRESSURE: 124 MMHG | RESPIRATION RATE: 16 BRPM

## 2025-06-09 DIAGNOSIS — M54.50 LUMBAR BACK PAIN: Primary | ICD-10-CM

## 2025-06-09 DIAGNOSIS — R10.30 LOWER ABDOMINAL PAIN: ICD-10-CM

## 2025-06-09 DIAGNOSIS — R51.9 ACUTE NONINTRACTABLE HEADACHE, UNSPECIFIED HEADACHE TYPE: ICD-10-CM

## 2025-06-09 DIAGNOSIS — R52 BODY ACHES: ICD-10-CM

## 2025-06-09 LAB
APPEARANCE UR: CLEAR
BILIRUB UR STRIP-MCNC: NEGATIVE MG/DL
COLOR UR AUTO: YELLOW
FLUAV RNA SPEC QL NAA+PROBE: NEGATIVE
FLUBV RNA SPEC QL NAA+PROBE: NEGATIVE
GLUCOSE UR STRIP.AUTO-MCNC: NEGATIVE MG/DL
KETONES UR STRIP.AUTO-MCNC: NEGATIVE MG/DL
LEUKOCYTE ESTERASE UR QL STRIP.AUTO: NEGATIVE
NITRITE UR QL STRIP.AUTO: NEGATIVE
PH UR STRIP.AUTO: 7 [PH] (ref 5–8)
PROT UR QL STRIP: NEGATIVE MG/DL
RBC UR QL AUTO: NEGATIVE
RSV RNA SPEC QL NAA+PROBE: NEGATIVE
SARS-COV-2 RNA RESP QL NAA+PROBE: NEGATIVE
SP GR UR STRIP.AUTO: 1.01
UROBILINOGEN UR STRIP-MCNC: 0.2 MG/DL

## 2025-06-09 PROCEDURE — 3074F SYST BP LT 130 MM HG: CPT | Performed by: NURSE PRACTITIONER

## 2025-06-09 PROCEDURE — 81002 URINALYSIS NONAUTO W/O SCOPE: CPT | Performed by: NURSE PRACTITIONER

## 2025-06-09 PROCEDURE — 0241U POCT CEPHEID COV-2, FLU A/B, RSV - PCR: CPT | Performed by: NURSE PRACTITIONER

## 2025-06-09 PROCEDURE — 3078F DIAST BP <80 MM HG: CPT | Performed by: NURSE PRACTITIONER

## 2025-06-09 PROCEDURE — 99214 OFFICE O/P EST MOD 30 MIN: CPT | Performed by: NURSE PRACTITIONER

## 2025-06-09 RX ORDER — SULFAMETHOXAZOLE AND TRIMETHOPRIM 800; 160 MG/1; MG/1
1 TABLET ORAL 2 TIMES DAILY
Qty: 14 TABLET | Refills: 0 | Status: SHIPPED | OUTPATIENT
Start: 2025-06-09 | End: 2025-06-16

## 2025-06-09 ASSESSMENT — ENCOUNTER SYMPTOMS
HEADACHES: 1
VOMITING: 0
BACK PAIN: 1
FEVER: 1
SORE THROAT: 0
SHORTNESS OF BREATH: 0
BLOOD IN STOOL: 0
ABDOMINAL PAIN: 1
COUGH: 0
DIZZINESS: 1
CHILLS: 1
NAUSEA: 0
DIARRHEA: 0
MYALGIAS: 1
CONSTIPATION: 0

## 2025-06-10 NOTE — PROGRESS NOTES
Subjective:     Carlos Ibanez is a 60 y.o. male who presents for Headache (Patient has been feeling dizziness, head and body aches, lower back pain x 1 day )      Presents for acute onset body aches, chills, and headache. Bilateral frontal headache. Also has right flank, and mild left abdominal pain. Sates pain is intermittently sharp with a dull pressure. No hx of diverticulitis.     Generalized Body Aches  This is a new problem. Associated symptoms include abdominal pain, chills, a fever, headaches and myalgias. Pertinent negatives include no congestion, coughing, nausea, sore throat, urinary symptoms or vomiting. He has tried acetaminophen for the symptoms.       Past Medical History[1]    Past Surgical History[2]    Social History     Socioeconomic History    Marital status:      Spouse name: Not on file    Number of children: Not on file    Years of education: Not on file    Highest education level: Master's degree (e.g., MA, MS, [place], MEd, MSW, [employer]EL[employer])   Occupational History    Not on file   Tobacco Use    Smoking status: Never    Smokeless tobacco: Never   Vaping Use    Vaping status: Never Used   Substance and Sexual Activity    Alcohol use: Yes     Alcohol/week: 2.4 oz     Types: 2 Cans of beer, 2 Shots of liquor per week    Drug use: Never    Sexual activity: Yes     Partners: Female     Birth control/protection: Male Sterilization   Other Topics Concern    Not on file   Social History Narrative    Not on file     Social Drivers of Health     Financial Resource Strain: Low Risk  (11/12/2024)    Overall Financial Resource Strain (CARDIA)     Difficulty of Paying Living Expenses: Not hard at all   Food Insecurity: No Food Insecurity (11/12/2024)    Hunger Vital Sign     Worried About Running Out of Food in the Last Year: Never true     Ran Out of Food in the Last Year: Never true   Transportation Needs: No Transportation Needs (11/12/2024)    PRAPARE - Transportation     Lack of Transportation  "(Medical): No     Lack of Transportation (Non-Medical): No   Physical Activity: Insufficiently Active (11/12/2024)    Exercise Vital Sign     Days of Exercise per Week: 3 days     Minutes of Exercise per Session: 30 min   Stress: Stress Concern Present (11/12/2024)    [employer] - Occupational Stress Questionnaire     Feeling of Stress : Very much   Social Connections: Socially Isolated (11/12/2024)    Social Connection and Isolation Panel [NHANES]     Frequency of Communication with Friends and Family: Once a week     Frequency of Social Gatherings with Friends and Family: Never     Attends Rastafari Services: Never     Active Member of Clubs or Organizations: No     Attends Club or Organization Meetings: Never     Marital Status:    Intimate Partner Violence: Not on file   Housing Stability: Low Risk  (11/12/2024)    Housing Stability Vital Sign     Unable to Pay for Housing in the Last Year: No     Number of Times Moved in the Last Year: 1     Homeless in the Last Year: No        Family History   Problem Relation Age of Onset    Diabetes Father     Hypertension Father     Stroke Father     Colorectal Cancer Paternal Grandmother         Allergies[3]    Review of Systems   Constitutional:  Positive for chills, fever and malaise/fatigue.   HENT:  Negative for congestion, ear pain and sore throat.    Respiratory:  Negative for cough and shortness of breath.    Gastrointestinal:  Positive for abdominal pain. Negative for blood in stool, constipation, diarrhea, nausea and vomiting.   Genitourinary:  Positive for flank pain.   Musculoskeletal:  Positive for back pain and myalgias.   Neurological:  Positive for dizziness and headaches.   All other systems reviewed and are negative.       Objective:   /60   Pulse 86   Temp 37.2 °C (99 °F)   Resp 16   Ht 1.727 m (5' 8\")   Wt 77.6 kg (171 lb)   SpO2 97%   BMI 26.00 kg/m²     Physical Exam  Vitals reviewed.   Constitutional:  "      General: He is not in acute distress.     Appearance: He is well-developed. He is not toxic-appearing.   HENT:      Head: Normocephalic and atraumatic.      Right Ear: External ear normal.      Left Ear: External ear normal.      Nose: Nose normal.      Mouth/Throat:      Pharynx: No oropharyngeal exudate.   Eyes:      Conjunctiva/sclera: Conjunctivae normal.      Pupils: Pupils are equal, round, and reactive to light.   Cardiovascular:      Rate and Rhythm: Normal rate.   Pulmonary:      Effort: Pulmonary effort is normal.      Breath sounds: Normal breath sounds.   Abdominal:      General: Bowel sounds are normal.      Palpations: Abdomen is soft.      Tenderness: There is abdominal tenderness in the suprapubic area and left lower quadrant.      Comments: Bilateral lumbar/flank TTP, greater on the right. No rash.    Musculoskeletal:      Cervical back: No rigidity.      Lumbar back: Tenderness present.   Skin:     General: Skin is warm and dry.      Findings: No rash.   Neurological:      Mental Status: He is alert and oriented to person, place, and time.      GCS: GCS eye subscore is 4. GCS verbal subscore is 5. GCS motor subscore is 6.   Psychiatric:         Speech: Speech normal.         Assessment/Plan:   1. Body aches  - POCT Urinalysis  - POCT CoV-2, Flu A/B, RSV by PCR    2. Lower abdominal pain  - POCT Urinalysis    3. Lumbar back pain  - POCT Urinalysis    4. Acute nonintractable headache, unspecified headache type  - POCT CoV-2, Flu A/B, RSV by PCR    Results for orders placed or performed in visit on 06/09/25   POCT Urinalysis    Collection Time: 06/09/25  5:34 PM   Result Value Ref Range    POC Color yellow Negative    POC Appearance clear Negative    POC Glucose negative Negative mg/dL    POC Bilirubin negative Negative mg/dL    POC Ketones negative Negative mg/dL    POC Specific Gravity 1.015 <1.005 - >1.030    POC Blood negative Negative    POC Urine PH 7.0 5.0 - 8.0    POC Protein negative  Negative mg/dL    POC Urobiligen 0.2 Negative (0.2) mg/dL    POC Nitrites negative Negative    POC Leukocyte Esterase negative Negative   POCT CoV-2, Flu A/B, RSV by PCR    Collection Time: 06/09/25  6:28 PM   Result Value Ref Range    SARS-CoV-2 by PCR Negative Negative, Invalid    Influenza virus A RNA Negative Negative, Invalid    Influenza virus B, PCR Negative Negative, Invalid    RSV, PCR Negative Negative, Invalid     Presents with acute body aches and headache. Has abdominal pain with TTP on exam. Negative UA and viral testing. Stable vitals. Differential to include possible viral correlation vs intraabdominal correlation. No neuro deficits noted. Advised ED follow up for further evaluation with abdominal pain.    Differential diagnosis, natural history, supportive care, and indications for immediate follow-up discussed.         [1]   Past Medical History:  Diagnosis Date    Anxiety    [2]   Past Surgical History:  Procedure Laterality Date    ARTHROPLASTY Right     ankle    OPEN REDUCTION Left     elbow    VASECTOMY     [3]   Allergies  Allergen Reactions    Penicillins Rash     Other Reaction(s): Unknown

## 2025-06-13 ASSESSMENT — ENCOUNTER SYMPTOMS
FLANK PAIN: 1
BODY ACHES: 1

## 2025-08-02 DIAGNOSIS — F41.9 ANXIETY: ICD-10-CM

## 2025-08-04 RX ORDER — LORAZEPAM 1 MG/1
TABLET ORAL
Qty: 30 TABLET | Refills: 0 | Status: SHIPPED | OUTPATIENT
Start: 2025-08-04 | End: 2025-09-03

## 2025-08-06 ENCOUNTER — OFFICE VISIT (OUTPATIENT)
Dept: UROLOGY | Facility: MEDICAL CENTER | Age: 60
End: 2025-08-06
Attending: UROLOGY
Payer: COMMERCIAL

## 2025-08-06 DIAGNOSIS — N40.1 BPH WITH OBSTRUCTION/LOWER URINARY TRACT SYMPTOMS: Primary | ICD-10-CM

## 2025-08-06 DIAGNOSIS — R31.9 HEMATURIA, UNSPECIFIED TYPE: ICD-10-CM

## 2025-08-06 DIAGNOSIS — N13.8 BPH WITH OBSTRUCTION/LOWER URINARY TRACT SYMPTOMS: Primary | ICD-10-CM

## 2025-08-06 LAB
APPEARANCE UR: CLEAR
BILIRUB UR STRIP-MCNC: NORMAL MG/DL
COLOR UR AUTO: YELLOW
GLUCOSE UR STRIP.AUTO-MCNC: NORMAL MG/DL
KETONES UR STRIP.AUTO-MCNC: NORMAL MG/DL
LEUKOCYTE ESTERASE UR QL STRIP.AUTO: NORMAL
NITRITE UR QL STRIP.AUTO: NORMAL
PH UR STRIP.AUTO: 7 [PH] (ref 5–8)
PROT UR QL STRIP: NORMAL MG/DL
RBC UR QL AUTO: NORMAL
SP GR UR STRIP.AUTO: 1.01
UROBILINOGEN UR STRIP-MCNC: 0.2 MG/DL

## 2025-08-06 PROCEDURE — 51784 ANAL/URINARY MUSCLE STUDY: CPT | Performed by: STUDENT IN AN ORGANIZED HEALTH CARE EDUCATION/TRAINING PROGRAM

## 2025-08-06 PROCEDURE — 51741 ELECTRO-UROFLOWMETRY FIRST: CPT | Performed by: STUDENT IN AN ORGANIZED HEALTH CARE EDUCATION/TRAINING PROGRAM

## 2025-08-06 PROCEDURE — 51797 INTRAABDOMINAL PRESSURE TEST: CPT | Performed by: STUDENT IN AN ORGANIZED HEALTH CARE EDUCATION/TRAINING PROGRAM

## 2025-08-06 PROCEDURE — 51728 CYSTOMETROGRAM W/VP: CPT | Performed by: STUDENT IN AN ORGANIZED HEALTH CARE EDUCATION/TRAINING PROGRAM

## 2025-08-06 PROCEDURE — 81002 URINALYSIS NONAUTO W/O SCOPE: CPT | Performed by: STUDENT IN AN ORGANIZED HEALTH CARE EDUCATION/TRAINING PROGRAM

## 2025-08-13 ENCOUNTER — OFFICE VISIT (OUTPATIENT)
Dept: MEDICAL GROUP | Facility: PHYSICIAN GROUP | Age: 60
End: 2025-08-13
Payer: COMMERCIAL

## 2025-08-13 ENCOUNTER — HOSPITAL ENCOUNTER (OUTPATIENT)
Facility: MEDICAL CENTER | Age: 60
End: 2025-08-13
Attending: NURSE PRACTITIONER
Payer: COMMERCIAL

## 2025-08-13 VITALS
SYSTOLIC BLOOD PRESSURE: 128 MMHG | DIASTOLIC BLOOD PRESSURE: 76 MMHG | BODY MASS INDEX: 25.46 KG/M2 | TEMPERATURE: 97.8 F | RESPIRATION RATE: 16 BRPM | WEIGHT: 168 LBS | HEART RATE: 66 BPM | OXYGEN SATURATION: 98 % | HEIGHT: 68 IN

## 2025-08-13 DIAGNOSIS — Z85.21 HISTORY OF LARYNGEAL CANCER: ICD-10-CM

## 2025-08-13 DIAGNOSIS — F41.9 ANXIETY: ICD-10-CM

## 2025-08-13 DIAGNOSIS — Z79.899 HIGH RISK MEDICATION USE: Primary | ICD-10-CM

## 2025-08-13 PROBLEM — C32.9 LARYNGEAL CANCER (HCC): Status: ACTIVE | Noted: 2025-08-13

## 2025-08-13 PROBLEM — I25.9 CHRONIC ISCHEMIC HEART DISEASE: Status: ACTIVE | Noted: 2025-08-13

## 2025-08-13 PROBLEM — K21.9 GASTRO-ESOPHAGEAL REFLUX DISEASE WITHOUT ESOPHAGITIS: Status: ACTIVE | Noted: 2025-08-13

## 2025-08-13 PROBLEM — G89.29 CHRONIC NECK PAIN: Status: ACTIVE | Noted: 2025-08-13

## 2025-08-13 PROBLEM — M54.2 CHRONIC NECK PAIN: Status: ACTIVE | Noted: 2025-08-13

## 2025-08-13 PROCEDURE — 3074F SYST BP LT 130 MM HG: CPT | Performed by: NURSE PRACTITIONER

## 2025-08-13 PROCEDURE — 80307 DRUG TEST PRSMV CHEM ANLYZR: CPT

## 2025-08-13 PROCEDURE — 99214 OFFICE O/P EST MOD 30 MIN: CPT | Performed by: NURSE PRACTITIONER

## 2025-08-13 PROCEDURE — 3078F DIAST BP <80 MM HG: CPT | Performed by: NURSE PRACTITIONER

## 2025-08-13 RX ORDER — CIPROFLOXACIN 500 MG/1
500 TABLET, FILM COATED ORAL 2 TIMES DAILY
COMMUNITY
Start: 2025-06-10 | End: 2025-08-13

## 2025-08-13 RX ORDER — METRONIDAZOLE 500 MG/1
500 TABLET ORAL EVERY 8 HOURS
COMMUNITY
Start: 2025-06-10 | End: 2025-08-13

## 2025-08-13 RX ORDER — TAMSULOSIN HYDROCHLORIDE 0.4 MG/1
0.4 CAPSULE ORAL
COMMUNITY

## 2025-08-13 ASSESSMENT — ENCOUNTER SYMPTOMS
CHILLS: 0
DIZZINESS: 0
FLANK PAIN: 0
VOMITING: 0
DIARRHEA: 0
NAUSEA: 0
HEARTBURN: 1
INSOMNIA: 0
BLOOD IN STOOL: 0
FEVER: 0
WEIGHT LOSS: 0
DEPRESSION: 0
CONSTIPATION: 0
EYES NEGATIVE: 1
MUSCULOSKELETAL NEGATIVE: 1
NERVOUS/ANXIOUS: 1
PALPITATIONS: 0
SHORTNESS OF BREATH: 0
ABDOMINAL PAIN: 0
HEADACHES: 0

## 2025-08-14 DIAGNOSIS — F41.9 ANXIETY: ICD-10-CM

## 2025-08-14 DIAGNOSIS — Z79.899 HIGH RISK MEDICATION USE: ICD-10-CM

## 2025-08-16 LAB
AMPHET CTO UR CFM-MCNC: NEGATIVE NG/ML
BARBITURATES CTO UR CFM-MCNC: NEGATIVE NG/ML
BENZODIAZ CTO UR CFM-MCNC: NEGATIVE NG/ML
BUPRENORPHINE UR-MCNC: NEGATIVE NG/ML
CANNABINOIDS CTO UR CFM-MCNC: NEGATIVE NG/ML
CARISOPRODOL UR-MCNC: NEGATIVE NG/ML
COCAINE CTO UR CFM-MCNC: NEGATIVE NG/ML
CREAT UR-MCNC: 26.9 MG/DL (ref 20–400)
DRUG SCREEN COMMENT UR-IMP: NORMAL
ETHYL GLUCURONIDE UR QL SCN: NEGATIVE NG/ML
FENTANYL UR-MCNC: NEGATIVE NG/ML
MEPERIDINE CTO UR SCN-MCNC: NEGATIVE NG/ML
METHADONE CTO UR CFM-MCNC: NEGATIVE NG/ML
OPIATES UR QL SCN: NEGATIVE NG/ML
OXYCDOXYM URSCRN 2005102: NEGATIVE NG/ML
PCP CTO UR CFM-MCNC: NEGATIVE NG/ML
PROPOXYPH CTO UR CFM-MCNC: NEGATIVE NG/ML
TAPENTADOL UR-MCNC: NEGATIVE NG/ML
TRAMADOL CTO UR SCN-MCNC: NEGATIVE NG/ML
ZOLPIDEM UR-MCNC: NEGATIVE NG/ML